# Patient Record
Sex: MALE | Race: WHITE | NOT HISPANIC OR LATINO | Employment: OTHER | URBAN - METROPOLITAN AREA
[De-identification: names, ages, dates, MRNs, and addresses within clinical notes are randomized per-mention and may not be internally consistent; named-entity substitution may affect disease eponyms.]

---

## 2017-01-09 RX ORDER — LISINOPRIL 5 MG/1
TABLET ORAL
Qty: 90 TABLET | Refills: 3 | Status: SHIPPED | OUTPATIENT
Start: 2017-01-09 | End: 2018-01-16 | Stop reason: SDUPTHER

## 2017-01-25 DIAGNOSIS — I47.29 NON-SUSTAINED VENTRICULAR TACHYCARDIA (HCC): Primary | ICD-10-CM

## 2017-02-16 ENCOUNTER — TELEPHONE (OUTPATIENT)
Dept: CARDIOLOGY | Facility: CLINIC | Age: 57
End: 2017-02-16

## 2017-02-16 RX ORDER — METOPROLOL SUCCINATE 50 MG/1
50 TABLET, EXTENDED RELEASE ORAL DAILY
Qty: 90 TABLET | Refills: 3 | Status: SHIPPED | OUTPATIENT
Start: 2017-02-16 | End: 2017-05-18

## 2017-02-16 NOTE — TELEPHONE ENCOUNTER
In regards to remote device check dated 1/21/17-   PT got labs done- Dr. Campbell reviewed-  No changes based off of lab work-    However, she would like to increase Toprol XL to 50 mg daily- I called the PT and discussed thi with him- he verbalized understanding-  New script sent to pharmacy-

## 2017-05-18 ENCOUNTER — OFFICE VISIT (OUTPATIENT)
Dept: CARDIOLOGY | Facility: CLINIC | Age: 57
End: 2017-05-18

## 2017-05-18 VITALS
SYSTOLIC BLOOD PRESSURE: 120 MMHG | HEIGHT: 69 IN | WEIGHT: 257.1 LBS | BODY MASS INDEX: 38.08 KG/M2 | DIASTOLIC BLOOD PRESSURE: 70 MMHG | HEART RATE: 108 BPM

## 2017-05-18 DIAGNOSIS — I10 ESSENTIAL HYPERTENSION: Chronic | ICD-10-CM

## 2017-05-18 DIAGNOSIS — E78.2 MIXED HYPERLIPIDEMIA: Chronic | ICD-10-CM

## 2017-05-18 DIAGNOSIS — I42.8 NONISCHEMIC CARDIOMYOPATHY (HCC): Primary | Chronic | ICD-10-CM

## 2017-05-18 PROCEDURE — 93289 INTERROG DEVICE EVAL HEART: CPT | Performed by: INTERNAL MEDICINE

## 2017-05-18 PROCEDURE — 99214 OFFICE O/P EST MOD 30 MIN: CPT | Performed by: NURSE PRACTITIONER

## 2017-05-18 RX ORDER — ACETAMINOPHEN 650 MG/1
TABLET, FILM COATED, EXTENDED RELEASE ORAL AS NEEDED
Refills: 2 | COMMUNITY
Start: 2017-04-28 | End: 2017-12-07

## 2017-05-18 RX ORDER — METOPROLOL SUCCINATE 25 MG/1
50 TABLET, EXTENDED RELEASE ORAL 2 TIMES DAILY
Refills: 2 | COMMUNITY
Start: 2017-04-28

## 2017-08-30 ENCOUNTER — CLINICAL SUPPORT NO REQUIREMENTS (OUTPATIENT)
Dept: CARDIOLOGY | Facility: CLINIC | Age: 57
End: 2017-08-30

## 2017-08-30 DIAGNOSIS — I42.8 NONISCHEMIC CARDIOMYOPATHY (HCC): Chronic | ICD-10-CM

## 2017-08-30 PROCEDURE — 93295 DEV INTERROG REMOTE 1/2/MLT: CPT | Performed by: INTERNAL MEDICINE

## 2017-08-30 PROCEDURE — 93296 REM INTERROG EVL PM/IDS: CPT | Performed by: INTERNAL MEDICINE

## 2017-12-07 ENCOUNTER — OFFICE VISIT (OUTPATIENT)
Dept: CARDIOLOGY | Facility: CLINIC | Age: 57
End: 2017-12-07

## 2017-12-07 VITALS
SYSTOLIC BLOOD PRESSURE: 104 MMHG | HEIGHT: 69 IN | DIASTOLIC BLOOD PRESSURE: 62 MMHG | WEIGHT: 256.6 LBS | HEART RATE: 74 BPM | BODY MASS INDEX: 38 KG/M2 | OXYGEN SATURATION: 96 %

## 2017-12-07 DIAGNOSIS — I10 ESSENTIAL HYPERTENSION: Chronic | ICD-10-CM

## 2017-12-07 DIAGNOSIS — E78.2 MIXED HYPERLIPIDEMIA: Chronic | ICD-10-CM

## 2017-12-07 DIAGNOSIS — I42.8 NONISCHEMIC CARDIOMYOPATHY (HCC): Primary | Chronic | ICD-10-CM

## 2017-12-07 PROCEDURE — 93283 PRGRMG EVAL IMPLANTABLE DFB: CPT | Performed by: INTERNAL MEDICINE

## 2017-12-07 PROCEDURE — 99213 OFFICE O/P EST LOW 20 MIN: CPT | Performed by: INTERNAL MEDICINE

## 2017-12-07 NOTE — PROGRESS NOTES
Ren Mario  1960  [phone]      12/07/2017    Heidi Ellington, APRN    Chief Complaint   Patient presents with   • Cardiomyopathy     Problem List  1. Nonischemic cardiomyopathy:  a. Abnormal echocardiogram, July 2014, revealing LVEF (25% to 30%) with mild-to-moderate mitral regurgitation, trace tricuspid regurgitation, and mild diastolic dysfunction.  b. Abnormal Cardiolite Lexiscan study, 08/07/2014, Candida Campbell MD, revealing severe left ventricular systolic dysfunction with an ejection fraction of 25%, with evidence of potential ischemia in the anterolateral and inferior myocardial segments.  c. Cardiac catheterization, 09/02/2014, Candida Campbell MD, revealing noncritical coronary artery disease and LVEF (25% to 30%).  d. Medical management with initiation of ACE inhibitor therapy (patient was already on beta blockade).  e. LVEF (20%), 12/08/2014 with trace MR and trace TR.   f. Dual-chamber ICD placed 04/21/2015, by Dr. Leroy Priest using a [facility] [employer] Ellip[employer] DRJaylin quintero. Echocardiogram, 11/7/2016: EF= 45-50%. Trace MR and trace TR.   2. Hypertension.   3. Hyperlipidemia.  4. Obesity with BMI 40.4.  5. Type 2 diabetes mellitus.  6. Chronic low back pain.  7. Surgical history:  a. Left arm fracture at age [age].      Allergies   Allergen Reactions   • Lipitor [Atorvastatin]      muscle ache and fatigue.   • Naprosyn [Naproxen]        Current Medications:      Current Outpatient Prescriptions:   •  allopurinol (ZYLOPRIM) 300 MG tablet, Daily., Disp: , Rfl:   •  amLODIPine (NORVASC) 5 MG tablet, Daily., Disp: , Rfl:   •  ASPIRIN LOW DOSE 81 MG EC tablet, Daily., Disp: , Rfl: 1  •  colchicine 0.6 MG tablet, As Needed., Disp: , Rfl: 2  •  cyclobenzaprine (FLEXERIL) 10 MG tablet, As Needed., Disp: , Rfl:   •  furosemide (LASIX) 40 MG tablet, Daily., Disp: , Rfl:   •  lisinopril (PRINIVIL,ZESTRIL) 5 MG tablet, TAKE ONE TABLET BY MOUTH DAILY, Disp: 90 tablet, Rfl: 3  •  metFORMIN  "(GLUCOPHAGE) 1000 MG tablet, 2 (Two) Times a Day., Disp: , Rfl: 2  •  metoprolol succinate XL (TOPROL-XL) 25 MG 24 hr tablet, 2 (Two) Times a Day., Disp: , Rfl: 2  •  potassium chloride (K-DUR,KLOR-CON) 20 MEQ CR tablet, Daily., Disp: , Rfl:   •  rosuvastatin (CRESTOR) 20 MG tablet, Daily., Disp: , Rfl: 1    HPI    Ren Mario presents today for 6 month follow up of nonischemic cardiomyopathy, hypertension, and hyperlipidemia. Since last visit, patient has been feeling well overall from a cardiovascular standpoint. He monitors his blood pressure at home and notes it typically runs within normal limits. He notes some shortness of breath when he bends over. Patient denies chest pain, palpitations, shortness of breath, edema, PND, orthopnea, dizziness, and syncope. He has been cutting fire wood for exercise. He believes that DIEUDONNE Bryant, is following his labs.His ICD site itches  Occasionally.    The following portions of the patient's history were reviewed and updated as appropriate: allergies, current medications and problem list.    Pertinent positives as listed in the HPI.  All other systems reviewed are negative.    Vitals:    12/07/17 1301   BP: 104/62   BP Location: Right arm   Patient Position: Sitting   Pulse: 74   SpO2: 96%   Weight: 116 kg (256 lb 9.6 oz)   Height: 175.3 cm (69\")       Physical Exam:  General: Alert and oriented to person, place, and time.  Neck: Jugular venous pressure is within normal limits. Carotids have normal upstrokes without bruits.   Cardiovascular: Regular rate and rhythm without murmur gallop or rub.  Lungs: Clear without rales or wheezes. Equal expansion is noted.   Extremities: Show no edema.  Skin: warm and dry.  Neurologic: nonfocal    Diagnostic Data:    Procedures     DEVICE INTERROGATION:  12/7/2017, [facility] ICD: RA pacing 2.8%, RV pacing <1%. P wave is >5 mV with a threshold of 0.5 V at 0.5 msec and an impedance of 450 ohms. R wave is 11.7 mV with a threshold of 0.5 " V at 0.5 msec and an impedance of 560 ohms. HV 95 ohms. Battery voltage is 73%, 5.4-6.2 years. NSVT 10/14/2017, SVT 12/6/2017. AMS <1%. DDDR 60/120.        Assessment:      ICD-10-CM ICD-9-CM   1. Nonischemic cardiomyopathy I42.8 425.4   2. Essential hypertension I10 401.9   3. Mixed hyperlipidemia E78.2 272.2       Plan:    1.   2. Continue current medications.  3. F/up in 6 months with device interrogation, or sooner if needed.    Scribed for Candida Campbell MD by Nima Luu. 12/7/2017  2:33 PM    I Candida Campbell MD personally performed the services described in this documentation as scribed by the above individual in my presence, and it is both accurate and complete.    Candida Campbell MD, FACC    I Candida Campbell MD personally performed the services described in this documentation as scribed by the above individual in my presence, and it is both accurate and complete.    Candida Campbell MD, FACC

## 2018-01-16 RX ORDER — LISINOPRIL 5 MG/1
TABLET ORAL
Qty: 90 TABLET | Refills: 3 | Status: SHIPPED | OUTPATIENT
Start: 2018-01-16 | End: 2019-02-04 | Stop reason: SDUPTHER

## 2018-04-19 ENCOUNTER — TELEPHONE (OUTPATIENT)
Dept: CARDIOLOGY | Facility: CLINIC | Age: 58
End: 2018-04-19

## 2018-04-19 NOTE — TELEPHONE ENCOUNTER
Called pt due to Merlin home monitor not transmitting.  Pt has cell adapter and phone line both plugged up.  Asked for pt to unplug phone line.  Attempted to send in a manual transmission and unsure if cellular adapter is getting enough signal.  Will check tomorrow to see if pt connected.

## 2018-06-21 ENCOUNTER — OFFICE VISIT (OUTPATIENT)
Dept: CARDIOLOGY | Facility: CLINIC | Age: 58
End: 2018-06-21

## 2018-06-21 VITALS
BODY MASS INDEX: 38.36 KG/M2 | SYSTOLIC BLOOD PRESSURE: 90 MMHG | OXYGEN SATURATION: 98 % | HEART RATE: 72 BPM | HEIGHT: 69 IN | DIASTOLIC BLOOD PRESSURE: 60 MMHG | WEIGHT: 259 LBS

## 2018-06-21 DIAGNOSIS — E78.2 MIXED HYPERLIPIDEMIA: Chronic | ICD-10-CM

## 2018-06-21 DIAGNOSIS — I10 ESSENTIAL HYPERTENSION: Chronic | ICD-10-CM

## 2018-06-21 DIAGNOSIS — I42.8 NONISCHEMIC CARDIOMYOPATHY (HCC): Primary | Chronic | ICD-10-CM

## 2018-06-21 PROCEDURE — 99213 OFFICE O/P EST LOW 20 MIN: CPT | Performed by: NURSE PRACTITIONER

## 2018-06-21 NOTE — PROGRESS NOTES
Ren Mario  1960  [phone]      06/21/2018    Heidi Ellington, APRN    Chief Complaint   Patient presents with   • Cardiomyopathy     Denies having any complaints   • Pacemaker Check   • Hypertension       Problem List  1. Nonischemic cardiomyopathy:  a. Abnormal echocardiogram, July 2014, revealing LVEF (25% to 30%) with mild-to-moderate mitral regurgitation, trace tricuspid regurgitation, and mild diastolic dysfunction.  b. Abnormal Cardiolite Lexiscan study, 08/07/2014, Candida Campbell MD, revealing severe left ventricular systolic dysfunction with an ejection fraction of 25%, with evidence of potential ischemia in the anterolateral and inferior myocardial segments.  c. Cardiac catheterization, 09/02/2014, Candida Campbell MD, revealing noncritical coronary artery disease and LVEF (25% to 30%).  d. Medical management with initiation of ACE inhibitor therapy (patient was already on beta blockade).  e. LVEF (20%), 12/08/2014 with trace MR and trace TR.   f. Dual-chamber ICD placed 04/21/2015, by Dr. Leroy Priest using a [facility] [employer] Ellipse DR. quintero. Echocardiogram, 11/7/2016: EF= 45-50%. Trace MR and trace TR.   2. Hypertension.   3. Hyperlipidemia.  4. Obesity with BMI 40.4.  5. Type 2 diabetes mellitus.  6. Chronic low back pain.  7. Surgical history:  a. Left arm fracture at age [age].    Allergies   Allergen Reactions   • Lipitor [Atorvastatin]      muscle ache and fatigue.   • Naprosyn [Naproxen]        Current Medications    Current Outpatient Prescriptions:   •  allopurinol (ZYLOPRIM) 300 MG tablet, Daily., Disp: , Rfl:   •  amLODIPine (NORVASC) 5 MG tablet, Daily., Disp: , Rfl:   •  ASPIRIN LOW DOSE 81 MG EC tablet, Daily., Disp: , Rfl: 1  •  colchicine 0.6 MG tablet, As Needed., Disp: , Rfl: 2  •  cyclobenzaprine (FLEXERIL) 10 MG tablet, As Needed., Disp: , Rfl:   •  furosemide (LASIX) 40 MG tablet, Daily., Disp: , Rfl:   •  lisinopril (PRINIVIL,ZESTRIL) 5 MG tablet, TAKE ONE  "TABLET BY MOUTH DAILY, Disp: 90 tablet, Rfl: 3  •  metFORMIN (GLUCOPHAGE) 1000 MG tablet, 2 (Two) Times a Day., Disp: , Rfl: 2  •  metoprolol succinate XL (TOPROL-XL) 25 MG 24 hr tablet, 2 (Two) Times a Day., Disp: , Rfl: 2  •  potassium chloride (K-DUR,KLOR-CON) 20 MEQ CR tablet, Daily., Disp: , Rfl:   •  rosuvastatin (CRESTOR) 20 MG tablet, Daily., Disp: , Rfl: 1    History of Present Illness   HPI  Patient is a pleasant 57-year-old  male with the above-noted medical history presents today for 6 month follow-up of his nonischemic cardiomyopathy, hypertension and hyperlipidemia.  Since his last visit he has been due to feeling well from a cardiac standpoint.  His most recent lipid status was drawn on March 25, 2018 indicating an LDL of 9 and an HDL of 33.  He denies having any episodes of chest pain, shortness of breath, dyspnea on exertion, edema, fatigue, palpitations, dizziness and syncope.  He does admit that he continues to eat too much sodium in his diet and his device is showing intermittent fluctuations on [employer] regarding this.  Although he denies being symptomatic, we discussed the importance of keeping his sodium intake at a minimum regarding his heart failure.  Overall he is doing a cardiac standpoint with no complaints.    The following portions of the patient's history were reviewed and updated as appropriate: allergies, current medications and problem list.    Pertinent positives as listed in the HPI.  All other systems reviewed are negative.    Vitals:    06/21/18 1359   BP: 90/60   BP Location: Right arm   Patient Position: Sitting   Pulse: 72   SpO2: 98%   Weight: 117 kg (259 lb)   Height: 175.3 cm (69\")       Physical Exam  GENERAL: well-developed, well-nourished; in no acute distress.   NECK:  There is no jugular venous distention at 30°.  Carotid upstrokes are 2+ and  symmetrical without bruits.   LUNGS: Clear to auscultation bilaterally without wheezing, rhonchi, or rales noted. "   CARDIOVASCULAR: The heart has a regular rate with a normal S1 and S2. There is no murmur, gallop, rub, or click appreciated. The PMI is nondisplaced.   ABDOMEN: Soft and nontender  NEUROLOGICAL: Nonfocal; Alert and oriented  PERIPHERAL VASCULAR:  Posterior tibial and dorsalis pedis pulses are 2+ and symmetrical. There is no peripheral edema.   MUSCULOSKELETAL:  Normal ROM  SKIN:  Warm and dry  PSYCHIATRIC: normal mood and affect; behavior appropriate    Diagnostic Data:  Procedures   DEVICE INTERROGATION:  6/21/2018, [id number] .: RA pacing 7.5%, RV pacing <1%. P wave is 4.7 mV with a threshold of 0.62 V at 0.5 msec and an impedance of 410 ohms. R wave is 11.7 mV with a threshold of 0.5 V at 0.5 msec and an impedance of 460 ohms. Battery voltage is 68 % @ 5.8 years.  Mode switching stent ×8 with 3 episodes of atrial tach.  Longest was 2 minutes and 54 seconds in the right the 140s on January 14.  There was a single recent episode on June 5 of short duration.  All other mode switches were competitive a pacing without evidence of atrial fibrillation..    Assessment:      ICD-10-CM ICD-9-CM   1. Nonischemic cardiomyopathy I42.8 425.4   2. Essential hypertension I10 401.9   3. Mixed hyperlipidemia E78.2 272.2       Plan:  Decrease sodium intake  Encouraged routine exercise  Continue current medications as directed  F/up in 6 months with [facility] or sooner if needed.    Seen independently by DIEUDONNE Esparza on June 21, 2018 [id number]

## 2018-09-27 ENCOUNTER — TELEPHONE (OUTPATIENT)
Dept: CARDIOLOGY | Facility: CLINIC | Age: 58
End: 2018-09-27

## 2018-09-28 ENCOUNTER — TELEPHONE (OUTPATIENT)
Dept: CARDIOLOGY | Facility: CLINIC | Age: 58
End: 2018-09-28

## 2018-09-28 NOTE — TELEPHONE ENCOUNTER
Mr Mario returned my call regarding his home monitor. We made several attempts to manually transmit and it would not connect. I gave him tech support at [facility] and ask him to give them a call for assistance.

## 2019-01-03 ENCOUNTER — OFFICE VISIT (OUTPATIENT)
Dept: CARDIOLOGY | Facility: CLINIC | Age: 59
End: 2019-01-03

## 2019-01-03 VITALS
BODY MASS INDEX: 37.33 KG/M2 | HEART RATE: 70 BPM | WEIGHT: 252 LBS | HEIGHT: 69 IN | DIASTOLIC BLOOD PRESSURE: 58 MMHG | SYSTOLIC BLOOD PRESSURE: 104 MMHG

## 2019-01-03 DIAGNOSIS — I10 ESSENTIAL HYPERTENSION: Chronic | ICD-10-CM

## 2019-01-03 DIAGNOSIS — I42.8 NONISCHEMIC CARDIOMYOPATHY (HCC): Primary | Chronic | ICD-10-CM

## 2019-01-03 DIAGNOSIS — E78.2 MIXED HYPERLIPIDEMIA: Chronic | ICD-10-CM

## 2019-01-03 PROCEDURE — 93283 PRGRMG EVAL IMPLANTABLE DFB: CPT | Performed by: INTERNAL MEDICINE

## 2019-01-03 PROCEDURE — 99214 OFFICE O/P EST MOD 30 MIN: CPT | Performed by: INTERNAL MEDICINE

## 2019-01-03 RX ORDER — LINAGLIPTIN 5 MG/1
5 TABLET, FILM COATED ORAL DAILY
Refills: 2 | COMMUNITY
Start: 2018-12-03 | End: 2020-05-21

## 2019-01-03 NOTE — PROGRESS NOTES
Ren Mario  1960  58 y.o.  [phone] 01/03/2019    Heidi Ellington APRN    Chief Complaint   Patient presents with   • Nonischemic cardiomyopathy (CMS/HCC)       Problem List:  1. Nonischemic cardiomyopathy:  a. Abnormal echocardiogram, July 2014: EF 25-30%. Mild-to-moderate MR, trace TR, and mild diastolic dysfunction.  b. Abnormal Cardiolite GXT, 08/07/2014, Candida Campbell MD: Severe left ventricular systolic dysfunction with an EF 25%, with evidence of potential ischemia in the anterolateral and inferior myocardial segments.  c. LHC, 09/02/2014, Candida Campbell MD: Non-critical CAD and EF 25-30%.  d. Medical management with initiation of ACE inhibitor therapy (patient was already on beta blockade).  e. Echocardiogram, 12/08/2014: EF 20% with trace MR and trace TR.   f. Dual-chamber ICD placed 04/21/2015, by Dr. Leroy Priest using a [employer] [facility] [employer] Ell[employer] DRJaylin quintero. Echocardiogram, 11/07/2016: EF 45-50%. Trace MR and trace TR.   2. Hypertension.   3. Hyperlipidemia.  4. Obesity with BMI 40.4.  5. Type 2 diabetes mellitus.  6. Hypothyroidism dx 2018  7. Chronic low back pain.  8. Surgical history:  a. Left arm fracture at age [age].    Allergies   Allergen Reactions   • Lipitor [Atorvastatin]      muscle ache and fatigue.   • Naprosyn [Naproxen]        Current Medications:      Current Outpatient Medications:   •  allopurinol (ZYLOPRIM) 300 MG tablet, Daily., Disp: , Rfl:   •  amLODIPine (NORVASC) 5 MG tablet, Daily., Disp: , Rfl:   •  ASPIRIN LOW DOSE 81 MG EC tablet, Daily., Disp: , Rfl: 1  •  colchicine 0.6 MG tablet, As Needed., Disp: , Rfl: 2  •  cyclobenzaprine (FLEXERIL) 10 MG tablet, As Needed., Disp: , Rfl:   •  furosemide (LASIX) 40 MG tablet, Daily., Disp: , Rfl:   •  lisinopril (PRINIVIL,ZESTRIL) 5 MG tablet, TAKE ONE TABLET BY MOUTH DAILY, Disp: 90 tablet, Rfl: 3  •  metFORMIN (GLUCOPHAGE) 1000 MG tablet, 2 (Two) Times a Day., Disp: , Rfl: 2  •  metoprolol succinate XL  "(TOPROL-XL) 25 MG 24 hr tablet, 2 (Two) Times a Day., Disp: , Rfl: 2  •  potassium chloride (K-DUR,KLOR-CON) 20 MEQ CR tablet, Daily., Disp: , Rfl:   •  rosuvastatin (CRESTOR) 20 MG tablet, Daily., Disp: , Rfl: 1  •  TRADJENTA 5 MG tablet tablet, Take 5 mg by mouth Daily., Disp: , Rfl: 2    HPI    Ren Mario is a 58 y.o. male who presents today for 6 month follow up of nonischemic cardiomyopathy, hypertension, and hyperlipidemia. Since last visit, he has been doing well overall from a cardiovascular standpoint. He admits he does not have a regular exercise regimen, but is able to split and carry wood for up to 30 minutes, without significant limitations. He has been monitoring his BP at home, with readings within normal limits. Patient denies chest pain, palpitations, shortness of breath, edema, orthopnea, dizziness, and syncope. Patient reports he was recently put on Levothyroxine, but is unsure of the exact dosage.    The following portions of the patient's history were reviewed and updated as appropriate: allergies, current medications and problem list.    Pertinent positives as listed in the HPI.  All other systems reviewed are negative.    Vitals:    01/03/19 1508   BP: 104/58   BP Location: Right arm   Patient Position: Sitting   Pulse: 70   Weight: 114 kg (252 lb)   Height: 175.3 cm (69\")       Physical Exam:    General: Alert and oriented  Neck: Jugular venous pressure is within normal limits. Carotids have normal upstrokes without bruits.   Cardiovascular: Heart has a nondisplaced focal PMI. Regular rate and rhythm without murmur, gallop or rub.  Lungs: Clear without rales or wheezes. Equal expansion is noted.   Extremities: Show no edema.  Skin: warm and dry.  Neurologic: nonfocal    Diagnostic Data:    Lipid panel, 03/20/2018:  Chol 95  Trig 267 (H)  HDL 33  LDL 9    06/22/2018:  HDL 36  LDL 6      Procedures    DEVICE INTERROGATION: 1/3/2019, [facility] ICD:   RA pacing 10%, RV pacing < 1%%. P wave is " >5 mV with a threshold of 0.62 V at 0.5 msec and an impedance of 460 ohms. R wave is 11.7 mV with a threshold of 0.5 V at 0.5 msec and an impedance of 530 ohms.  Battery voltage is 63% (5.4 years).  Events: < 1% AMS, longest is 2 minutes 43 seconds.     Assessment:      ICD-10-CM ICD-9-CM   1. Nonischemic cardiomyopathy (CMS/HCC) I42.8 425.4   2. Essential hypertension I10 401.9   3. Mixed hyperlipidemia E78.2 272.2       Plan:    1. Echocardiogram, limited for EF, given cardiomyopathy with last echo in 2016.  2. Continue aspirin 81 mg for daily anticoagulation.  3. Continue amlodipine, lisinopril, metoprolol, and furosemide for hypertension.  4. Continue rosuvastatin 20 mg for hyperlipidemia.  5. Continue all other current medications.  6. F/up in 6 months with [facility] device interrogation or sooner if needed.    Scribed for Candida Campbell MD by Emmy Alberts. 1/3/2019  3:26 PM     I Candida Campbell MD personally performed the services described in this documentation as scribed by the above individual in my presence, and it is both accurate and complete.    Candida Campbell MD, [facility]

## 2019-01-08 ENCOUNTER — OUTSIDE FACILITY SERVICE (OUTPATIENT)
Dept: CARDIOLOGY | Facility: CLINIC | Age: 59
End: 2019-01-08

## 2019-01-08 ENCOUNTER — HOSPITAL ENCOUNTER (OUTPATIENT)
Dept: NON INVASIVE DIAGNOSTICS | Facility: HOSPITAL | Age: 59
Discharge: HOME | End: 2019-01-08
Payer: COMMERCIAL

## 2019-01-08 PROCEDURE — 93308 TTE F-UP OR LMTD: CPT

## 2019-01-08 PROCEDURE — 93306 TTE W/DOPPLER COMPLETE: CPT

## 2019-01-08 PROCEDURE — 93306 TTE W/DOPPLER COMPLETE: CPT | Performed by: INTERNAL MEDICINE

## 2019-02-04 RX ORDER — LISINOPRIL 5 MG/1
TABLET ORAL
Qty: 90 TABLET | Refills: 3 | Status: SHIPPED | OUTPATIENT
Start: 2019-02-04 | End: 2020-03-02

## 2019-08-01 ENCOUNTER — OFFICE VISIT (OUTPATIENT)
Dept: CARDIOLOGY | Facility: CLINIC | Age: 59
End: 2019-08-01

## 2019-08-01 VITALS
BODY MASS INDEX: 38.31 KG/M2 | SYSTOLIC BLOOD PRESSURE: 102 MMHG | WEIGHT: 252.8 LBS | RESPIRATION RATE: 18 BRPM | HEIGHT: 68 IN | DIASTOLIC BLOOD PRESSURE: 60 MMHG | HEART RATE: 79 BPM | OXYGEN SATURATION: 97 %

## 2019-08-01 DIAGNOSIS — E78.2 MIXED HYPERLIPIDEMIA: Chronic | ICD-10-CM

## 2019-08-01 DIAGNOSIS — I42.8 NONISCHEMIC CARDIOMYOPATHY (HCC): Primary | Chronic | ICD-10-CM

## 2019-08-01 DIAGNOSIS — I10 ESSENTIAL HYPERTENSION: Chronic | ICD-10-CM

## 2019-08-01 PROCEDURE — 93283 PRGRMG EVAL IMPLANTABLE DFB: CPT | Performed by: NURSE PRACTITIONER

## 2019-08-01 PROCEDURE — 99214 OFFICE O/P EST MOD 30 MIN: CPT | Performed by: NURSE PRACTITIONER

## 2019-08-01 RX ORDER — MAGNESIUM GLUCONATE 27 MG(500)
27 TABLET ORAL DAILY
COMMUNITY

## 2019-08-01 NOTE — PROGRESS NOTES
Ren Mario  1960  [phone]  Work phone not available    08/01/2019    Chandana, DIEUDONNE Seaman    Chief Complaint   Patient presents with   • Follow-up   • Shortness of Breath       Problem List:  1. Nonischemic cardiomyopathy:  a. Abnormal echocardiogram, July 2014: EF 25-30%. Mild-to-moderate MR, trace TR, and mild diastolic dysfunction.  b. Abnormal Cardiolite GXT, 08/07/2014, Candida Campbell MD: Severe left ventricular systolic dysfunction with an EF 25%, with evidence of potential ischemia in the anterolateral and inferior myocardial segments.  c. LHC, 09/02/2014, Candida Campbell MD: Non-critical CAD and EF 25-30%.  d. Medical management with initiation of ACE inhibitor therapy (patient was already on beta blockade).  e. Echocardiogram, 12/08/2014: EF 20% with trace MR and trace TR.   f. Dual-chamber ICD placed 04/21/2015, by Dr. Leroy Priest using a [facility] [employer] Ellipse DR. quintero. Echocardiogram, 11/07/2016: EF 45-50%. Trace MR and trace TR.   2. Hypertension.   3. Hyperlipidemia.  4. Obesity with BMI 40.4.  5. Type 2 diabetes mellitus.  6. Hypothyroidism dx 2018  7. Chronic low back pain.  8. Surgical history:  a. Left arm fracture at age [age].    Allergies   Allergen Reactions   • Lipitor [Atorvastatin]      muscle ache and fatigue.   • Naprosyn [Naproxen]        Current Medications    Current Outpatient Medications:   •  allopurinol (ZYLOPRIM) 300 MG tablet, Daily., Disp: , Rfl:   •  amLODIPine (NORVASC) 5 MG tablet, Daily., Disp: , Rfl:   •  ASPIRIN LOW DOSE 81 MG EC tablet, Daily., Disp: , Rfl: 1  •  colchicine 0.6 MG tablet, As Needed., Disp: , Rfl: 2  •  cyclobenzaprine (FLEXERIL) 10 MG tablet, As Needed., Disp: , Rfl:   •  furosemide (LASIX) 40 MG tablet, Daily., Disp: , Rfl:   •  lisinopril (PRINIVIL,ZESTRIL) 5 MG tablet, TAKE ONE TABLET BY MOUTH DAILY, Disp: 90 tablet, Rfl: 3  •  magnesium gluconate (MAGONATE) 500 MG tablet, Take 27 mg by mouth Daily., Disp: , Rfl:   •   "metFORMIN (GLUCOPHAGE) 1000 MG tablet, 2 (Two) Times a Day., Disp: , Rfl: 2  •  metoprolol succinate XL (TOPROL-XL) 25 MG 24 hr tablet, 2 (Two) Times a Day., Disp: , Rfl: 2  •  potassium chloride (K-DUR,KLOR-CON) 20 MEQ CR tablet, Daily., Disp: , Rfl:   •  rosuvastatin (CRESTOR) 20 MG tablet, Daily., Disp: , Rfl: 1  •  TRADJENTA 5 MG tablet tablet, Take 5 mg by mouth Daily., Disp: , Rfl: 2    History of Present Illness   HPI  Patient is a 58 year old male with the above noted medical history who presents today for his 6 month follow up of NICM, hypertension, and hyperlipidemia.  Since he was seen last, he has been feeling well from the cardiac standpoint.  He denies having any chest pain, shortness of breath, dyspnea on exertion, edema, fatigue, palpitations, dizziness, and syncope.  He ambulates with a can due to chronic back pain.  He does state that he gets most of his exercise hunting for mushrooms.  His device interrogation today revealed 4 brief episodes of NSVT (longest 8 seconds).  He was unaware of these events.  His lipids are followed by his primary.  Blood pressure and heart rate are adequately controlled on current medical therapy.      The following portions of the patient's history were reviewed and updated as appropriate: allergies, current medications and problem list.    Pertinent positives as listed in the HPI.  All other systems reviewed are negative.    Vitals:    08/01/19 1328   BP: 102/60   BP Location: Left arm   Patient Position: Sitting   Pulse: 79   Resp: 18   SpO2: 97%   Weight: 115 kg (252 lb 12.8 oz)   Height: 172.7 cm (68\")       Physical Exam  GENERAL: well-developed, well-nourished; in no acute distress.   NECK:  Carotid upstrokes are 2+ and  symmetrical without bruits.   LUNGS: Clear to auscultation bilaterally without wheezing, rhonchi, or rales noted.   CARDIOVASCULAR: The heart has a regular rate with a normal S1 and S2. There is no murmur, gallop, rub, or click appreciated. The " PMI is nondisplaced.   NEUROLOGICAL: Nonfocal; Alert and oriented  PERIPHERAL VASCULAR:  Posterior tibial pulses are 2+ and symmetrical. There is no peripheral edema.   SKIN:  Warm and dry  PSYCHIATRIC: normal mood and affect; behavior appropriate    Diagnostic Data:  Procedures  MANUAL DEVICE INTERROGATION:  8/1/2019, [facility] ICD Ellipse DR [id number]: RA pacing 14%, RV pacing <1%. P wave is >5.0 mV with a threshold of 0.75 V at 0.5 msec and an impedance of 450 ohms. R wave is 11.7 mV with a threshold of 0.5 V at 0.5 msec and an impedance of 560 ohms. Battery voltage is 4.3-4.9 years longevity.  4 episodes of NSVT with longest being 8 seconds.  He does not transmit from home.      Assessment:      ICD-10-CM ICD-9-CM   1. Nonischemic cardiomyopathy (CMS/HCC) I42.8 425.4   2. Essential hypertension I10 401.9   3. Mixed hyperlipidemia E78.2 272.2       Plan:  Encouraged routine exercise and dietary modifications  Continue aspirin for NICM   Continue amlodipine, lisinopril for hypertension  Continue toprol for rate control  Continue crestor for hyperlipidemia  F/up in 12 months or sooner if needed.      Seen independently by DIEUDONNE Esparza on August 1, 2019 @ [id number]

## 2019-09-03 ENCOUNTER — HOSPITAL ENCOUNTER (OUTPATIENT)
Facility: HOSPITAL | Age: 59
Discharge: HOME | End: 2019-09-03
Payer: COMMERCIAL

## 2019-09-03 ENCOUNTER — HOSPITAL ENCOUNTER (OUTPATIENT)
Dept: GENERAL RADIOLOGY | Facility: HOSPITAL | Age: 59
Discharge: HOME | End: 2019-09-03
Payer: COMMERCIAL

## 2019-09-03 DIAGNOSIS — M54.5 ACUTE BILATERAL LOW BACK PAIN, WITH SCIATICA PRESENCE UNSPECIFIED: ICD-10-CM

## 2019-09-03 PROCEDURE — 72100 X-RAY EXAM L-S SPINE 2/3 VWS: CPT

## 2019-12-10 ENCOUNTER — HOSPITAL ENCOUNTER (OUTPATIENT)
Dept: MRI IMAGING | Facility: HOSPITAL | Age: 59
Discharge: HOME | End: 2019-12-10
Payer: COMMERCIAL

## 2019-12-10 DIAGNOSIS — M54.42 ACUTE BACK PAIN WITH SCIATICA, LEFT: ICD-10-CM

## 2020-03-02 RX ORDER — LISINOPRIL 5 MG/1
TABLET ORAL
Qty: 90 TABLET | Refills: 0 | Status: SHIPPED | OUTPATIENT
Start: 2020-03-02 | End: 2020-04-06

## 2020-04-06 RX ORDER — LISINOPRIL 5 MG/1
TABLET ORAL
Qty: 90 TABLET | Refills: 1 | Status: SHIPPED | OUTPATIENT
Start: 2020-04-06 | End: 2021-02-15

## 2020-05-21 ENCOUNTER — OFFICE VISIT (OUTPATIENT)
Dept: CARDIOLOGY | Facility: CLINIC | Age: 60
End: 2020-05-21

## 2020-05-21 VITALS
OXYGEN SATURATION: 97 % | DIASTOLIC BLOOD PRESSURE: 78 MMHG | HEIGHT: 69 IN | BODY MASS INDEX: 39.1 KG/M2 | HEART RATE: 78 BPM | WEIGHT: 264 LBS | SYSTOLIC BLOOD PRESSURE: 144 MMHG

## 2020-05-21 DIAGNOSIS — I10 ESSENTIAL HYPERTENSION: Chronic | ICD-10-CM

## 2020-05-21 DIAGNOSIS — E78.2 MIXED HYPERLIPIDEMIA: Chronic | ICD-10-CM

## 2020-05-21 DIAGNOSIS — I42.8 NONISCHEMIC CARDIOMYOPATHY (HCC): Primary | Chronic | ICD-10-CM

## 2020-05-21 PROCEDURE — 99214 OFFICE O/P EST MOD 30 MIN: CPT | Performed by: INTERNAL MEDICINE

## 2020-05-21 RX ORDER — SITAGLIPTIN 100 MG/1
100 TABLET, FILM COATED ORAL DAILY
COMMUNITY
Start: 2020-04-27

## 2020-05-21 NOTE — PROGRESS NOTES
[facility]    Patient ID: Ren Mario is a 59 y.o. male.  : 1960   Contact: [phone]    Encounter date: 2020    PCP: Heidi Ellington APRN      Chief complaint:   Chief Complaint   Patient presents with   • Nonischemic cardiomyopathy (CMS/[facility]       Problem List:  1. Nonischemic cardiomyopathy:  a. Abnormal echocardiogram, 2014: EF 25-30%. Mild-to-moderate MR, trace TR, and mild diastolic dysfunction.  b. Abnormal Cardiolite GXT, 2014, [facility]: EF 25%. Evidence of potential ischemia in the anterolateral and inferior myocardial segments.  c. LHC, 2014, [facility]: Non-critical CAD. EF 25-30%.  d. Medical management with initiation of ACE inhibitor therapy (patient was already on beta blockade).  e. Echocardiogram, 2014: EF 20%. Ttrace MR and trace TR.   f. Dual-chamber ICD placed 2015, by Dr. Leroy Priest using a [employer] Ellipse DR. quintero. Echocardiogram, 2016: EF 45-50%. Trace MR and trace TR.   h. Echocardiogram, 2019: EF 55%. Borderline LV enlargement.  2. Hypertension.   3. Hyperlipidemia.  4. Obesity with BMI 39.  5. Type 2 DM.  6. Hypothyroidism dx 2018  7. Chronic low back pain.  8. Former tobacco abuse, cessation 15+ years ago.  9. Surgical history:  a. Left arm fracture at age [age].    Allergies   Allergen Reactions   • Lipitor [Atorvastatin]      muscle ache and fatigue.   • Naprosyn [Naproxen]        Current Medications:    Current Outpatient Medications:   •  allopurinol (ZYLOPRIM) 300 MG tablet, Take 300 mg by mouth Daily., Disp: , Rfl:   •  amLODIPine (NORVASC) 5 MG tablet, Take 5 mg by mouth Daily., Disp: , Rfl:   •  ASPIRIN LOW DOSE 81 MG EC tablet, Take 81 mg by mouth Daily., Disp: , Rfl: 1  •  colchicine 0.6 MG tablet, Take 0.6 mg by mouth As Needed., Disp: , Rfl: 2  •  cyclobenzaprine (FLEXERIL) 10 MG tablet, Take 10 mg by mouth As Needed., Disp: , Rfl:   •  furosemide (LASIX) 40 MG tablet, Take 40  "mg by mouth Daily., Disp: , Rfl:   •  lisinopril (PRINIVIL,ZESTRIL) 5 MG tablet, TAKE ONE TABLET BY MOUTH DAILY, Disp: 90 tablet, Rfl: 1  •  magnesium gluconate (MAGONATE) 500 MG tablet, Take 27 mg by mouth Daily., Disp: , Rfl:   •  metFORMIN (GLUCOPHAGE) 1000 MG tablet, Take 1,000 mg by mouth 2 (Two) Times a Day., Disp: , Rfl: 2  •  metoprolol succinate XL (TOPROL-XL) 25 MG 24 hr tablet, Take 25 mg by mouth 2 (Two) Times a Day., Disp: , Rfl: 2  •  potassium chloride (K-DUR,KLOR-CON) 20 MEQ CR tablet, Take 20 mEq by mouth Daily., Disp: , Rfl:   •  rosuvastatin (CRESTOR) 20 MG tablet, Take 20 mg by mouth Daily., Disp: , Rfl: 1  •  JANUVIA 100 MG tablet, Take 100 mg by mouth Daily., Disp: , Rfl:     HPI    Ren Mario is a 59 y.o. male who presents today for a follow up of nonischemic cardiomyopathy s/p ICD and cardiac risk factors. Since last visit, he has been feeling well overall from a cardiovascular standpoint. He does report some dyspnea on exertion. He does try to walk every day, when the weather permits. He does not have a formal exercise routine. He is unsure what his BP typically runs, but states he was told it had been \"very good\". Patient denies chest pain, palpitations, edema, dizziness, and syncope.        The following portions of the patient's history were reviewed and updated as appropriate: allergies, current medications and problem list.    Pertinent positives as listed in the HPI.  All other systems reviewed are negative.         Vitals:    05/21/20 1041   BP: 144/78   Pulse: 78   SpO2: 97%   Weight: 120 kg (264 lb)   Height: 175.3 cm (69\")       Physical Exam:  General: Alert and oriented.  Neck: Jugular venous pressure is within normal limits. Carotids have normal upstrokes without bruits.   Cardiovascular: Heart has a nondisplaced focal PMI. Regular rate and rhythm. No murmur, gallop or rub.  Lungs: Clear, no rales or wheezes. Equal expansion is noted.   Extremities: Show no edema.  Skin: " Warm and dry.  Neurologic: Nonfocal.     Diagnostic Data (reviewed with patient):    11/13/2019:  · FLP: TC 84,  (H), HDL 31 (L), LDL 6  · Hgb A1c: 6.2  · TSH: 2.32  · CMP: Glu 176 (H), otherwise normal CMP  · CBC: normal    Procedures      Assessment:    ICD-10-CM ICD-9-CM   1. Nonischemic cardiomyopathy (CMS/HCC) I42.8 425.4   2. Essential hypertension I10 401.9   3. Mixed hyperlipidemia E78.2 272.2     Patient congratulated on his lipid levels for November 2019.   Echo from Jan 2019 reviewed with patient. Normalization of EF.      Plan:  1. Begin routine aerobic exercise, at least 30 minutes 4 days per week.  2. Will need to return for ICD check in near future as he does not transmit from home.  3. Continue amlodipine, lisinopril, metoprolol, and Lasix for hypertension and NICM.  4. Continue rosuvastatin 20 mg for hyperlipidemia.  5. Continue all other current medications.  6. F/up in 6 months with device check, sooner if needed.      Scribed for Candida Campbell MD by Emmy Albrets. 5/21/2020  10:52     I Candida Campbell MD personally performed the services described in this documentation as scribed by the above individual in my presence, and it is both accurate and complete.    Candida Campbell MD, FACC

## 2020-11-19 ENCOUNTER — OFFICE VISIT (OUTPATIENT)
Dept: CARDIOLOGY | Facility: CLINIC | Age: 60
End: 2020-11-19

## 2020-11-19 VITALS
DIASTOLIC BLOOD PRESSURE: 64 MMHG | HEART RATE: 71 BPM | SYSTOLIC BLOOD PRESSURE: 126 MMHG | HEIGHT: 69 IN | OXYGEN SATURATION: 97 % | WEIGHT: 256.4 LBS | BODY MASS INDEX: 37.98 KG/M2

## 2020-11-19 DIAGNOSIS — E78.2 MIXED HYPERLIPIDEMIA: Chronic | ICD-10-CM

## 2020-11-19 DIAGNOSIS — I42.8 NONISCHEMIC CARDIOMYOPATHY (HCC): Primary | Chronic | ICD-10-CM

## 2020-11-19 DIAGNOSIS — I10 ESSENTIAL HYPERTENSION: Chronic | ICD-10-CM

## 2020-11-19 PROCEDURE — 99213 OFFICE O/P EST LOW 20 MIN: CPT | Performed by: INTERNAL MEDICINE

## 2020-11-19 PROCEDURE — 93283 PRGRMG EVAL IMPLANTABLE DFB: CPT | Performed by: INTERNAL MEDICINE

## 2020-11-19 NOTE — PROGRESS NOTES
[facility]    Patient ID: Ren Mario is a 60 y.o. male.  : 1960   Contact: [phone]    Encounter date: 2020    PCP: Heidi Ellington APRN      Chief complaint:   Chief Complaint   Patient presents with   • Nonischemic cardiomyopathy (CMS/HCC)       Problem List:  1. Nonischemic cardiomyopathy:  a. Abnormal echocardiogram, 2014: EF 25-30%. Mild-to-moderate MR, trace TR, and mild diastolic dysfunction.  b. Abnormal Cardiolite GXT, 2014, [facility]: EF 25%. Evidence of potential ischemia in the anterolateral and inferior myocardial segments.  c. LHC, 2014, [facility]: Non-critical CAD. EF 25-30%.  d. Medical management with initiation of ACE inhibitor therapy (patient was already on beta blockade).  e. Echocardiogram, 2014: EF 20%. Ttrace MR and trace TR.   f. Dual-chamber ICD placed 2015, by Dr. Leroy Priest using a [employer] Ellipse DR. quintero. Echocardiogram, 2016: EF 45-50%. Trace MR and trace TR.   h. Echocardiogram, 2019: EF 55%. Borderline LV enlargement.  2. Hypertension.   3. Hyperlipidemia.  4. Obesity with BMI 39.  5. Type 2 DM.  6. Hypothyroidism dx 2018  7. Chronic low back pain.  8. Former tobacco abuse, cessation 15+ years ago.  9. Surgical history:  a. Left arm fracture at age [age].    Allergies   Allergen Reactions   • Lipitor [Atorvastatin]      muscle ache and fatigue.   • Naprosyn [Naproxen]        Current Medications:    Current Outpatient Medications:   •  allopurinol (ZYLOPRIM) 300 MG tablet, Take 300 mg by mouth Daily., Disp: , Rfl:   •  amLODIPine (NORVASC) 5 MG tablet, Take 5 mg by mouth Daily., Disp: , Rfl:   •  ASPIRIN LOW DOSE 81 MG EC tablet, Take 81 mg by mouth Daily., Disp: , Rfl: 1  •  colchicine 0.6 MG tablet, Take 0.6 mg by mouth As Needed., Disp: , Rfl: 2  •  cyclobenzaprine (FLEXERIL) 10 MG tablet, Take 10 mg by mouth As Needed., Disp: , Rfl:   •  furosemide (LASIX) 40 MG tablet, Take 40  "mg by mouth Daily., Disp: , Rfl:   •  JANUVIA 100 MG tablet, Take 100 mg by mouth Daily., Disp: , Rfl:   •  lisinopril (PRINIVIL,ZESTRIL) 5 MG tablet, TAKE ONE TABLET BY MOUTH DAILY, Disp: 90 tablet, Rfl: 1  •  magnesium gluconate (MAGONATE) 500 MG tablet, Take 27 mg by mouth Daily., Disp: , Rfl:   •  metFORMIN (GLUCOPHAGE) 1000 MG tablet, Take 1,000 mg by mouth 2 (Two) Times a Day., Disp: , Rfl: 2  •  metoprolol succinate XL (TOPROL-XL) 25 MG 24 hr tablet, Take 25 mg by mouth 2 (Two) Times a Day., Disp: , Rfl: 2  •  potassium chloride (K-DUR,KLOR-CON) 20 MEQ CR tablet, Take 20 mEq by mouth Daily., Disp: , Rfl:   •  rosuvastatin (CRESTOR) 20 MG tablet, Take 20 mg by mouth Daily., Disp: , Rfl: 1    HPI    Ren Mario is a 60 y.o. male who presents today for a 6 month follow up of nonischemic cardiomyopathy and cardiac risk factors. Since last visit, he has been feeling well from a cardiovascular standpoint. He has been monitoring his blood pressure at home and notes that it is \"good\". He has been trying to walk daily and stays active around the house. He has not had any issues with his defibrillator and notes that it has not fired. Patient otherwise denies chest pain, shortness of breath, PND, edema, palpitations, syncope, or presyncope at this time.       The following portions of the patient's history were reviewed and updated as appropriate: allergies, current medications and problem list.    Pertinent positives as listed in the HPI.  All other systems reviewed are negative.         Vitals:    11/19/20 1432   BP: 126/64   BP Location: Left arm   Patient Position: Sitting   Pulse: 71   SpO2: 97%   Weight: 116 kg (256 lb 6.4 oz)   Height: 175.3 cm (69\")       Physical Exam:  General: Alert and oriented.  Neck: Jugular venous pressure is within normal limits. Carotids have normal upstrokes without bruits.   Cardiovascular: Heart has a nondisplaced focal PMI. Regular rate and rhythm. No murmur, gallop or " rub.  Lungs: Clear, no rales or wheezes. Equal expansion is noted.   Extremities: Show no edema.  Skin: Warm and dry.  Neurologic: Nonfocal.     Diagnostic Data (reviewed with patient):  Lab date: 11/13/2019  • FLP: TC 84, , HDL 31, LDL 6.0  • CMP: Glu 176, BUN 22, Creat 1.4, eGFR 57.3, Na 138, K 4.5, Cl 99, CO2 91, Ca 9.4, Alk Phos 61, AST 25, ALT 22  • CBC: WBC 8.6, RBC 4.36, HGB 13.1, HCT 39.6, MCV 90.8, MCH 30.1,        Procedures     [employer] Dual ICD:  DDDR 60, RA 16%, RV < 1%, P > 5 mV 0.62 at 0.5 ms, 460 ohms.  R 11.7 mV, 0.5 V at 0.5 ms, 630 ohms.  3.4-3.9 years.  NSVT x 5 longest 8 sec.AMS , 1%, longest 6 min      Assessment:    ICD-10-CM ICD-9-CM   1. Nonischemic cardiomyopathy (CMS/[facility])  I42.8 425.4   2. Mixed hyperlipidemia  E78.2 272.2   3. Essential hypertension  I10 401.9         Plan:  1. Continue lisinopril 5mg and metoprolol succinate XL 25mg for nonischemic cardiomyopathy.   2. Continue rosuvastatin 20mg for hyperlipidemia.   3. Continue amlodipine 5mg for hypertension.   4. Continue all other current medications.  5. F/up in 12 months, sooner if needed.    Scribed for Candida Campbell MD by Víctor Banerjee. 11/19/2020  14:43 EST     I Candida Campbell MD personally performed the services described in this documentation as scribed by the above individual in my presence, and it is both accurate and complete.    Candida Campbell MD, [facility]

## 2021-02-15 RX ORDER — LISINOPRIL 5 MG/1
TABLET ORAL
Qty: 90 TABLET | Refills: 1 | Status: SHIPPED | OUTPATIENT
Start: 2021-02-15 | End: 2021-08-23

## 2021-08-23 RX ORDER — LISINOPRIL 5 MG/1
TABLET ORAL
Qty: 60 TABLET | Refills: 0 | Status: SHIPPED | OUTPATIENT
Start: 2021-08-23 | End: 2021-09-27

## 2021-09-27 RX ORDER — LISINOPRIL 5 MG/1
TABLET ORAL
Qty: 90 TABLET | Refills: 0 | Status: SHIPPED | OUTPATIENT
Start: 2021-09-27 | End: 2022-01-28

## 2021-10-20 NOTE — PROGRESS NOTES
[facility] Card[facility]    Patient ID: Ren Mario is a 61 y.o. male.  : 1960   Contact: Home phone not available    Encounter date: 10/21/2021    PCP: Heidi Ellington APRN      Chief complaint:   Chief Complaint   Patient presents with   • Nonischemic cardiomyopathy (CMS/HCC)       Problem List:  1. Nonischemic cardiomyopathy:  a. Abnormal echocardiogram, 2014: EF 25-30%. Mild-to-moderate MR, trace TR, and mild diastolic dysfunction.  b. Abnormal Cardiolite GXT, 2014, [facility]: EF 25%. Evidence of potential ischemia in the anterolateral and inferior myocardial segments.  c. LHC, 2014, [facility]: Non-critical CAD. EF 25-30%.  d. Medical management with initiation of ACE inhibitor therapy (patient was already on beta blockade).  e. Echocardiogram, 2014: EF 20%. Ttrace MR and trace TR.   f. Dual-chamber ICD placed 2015, by Dr. Leroy Priest using a [employer] Ellipse DR. quintero. Echocardiogram, 2016: EF 45-50%. Trace MR and trace TR.   h. Echocardiogram, 2019: EF 55%. Borderline LV enlargement.  2. Hypertension.   3. Hyperlipidemia.  4. Obesity with BMI 39.  5. Type 2 DM.  6. Hypothyroidism dx 2018  7. Chronic low back pain.  8. Former tobacco abuse, cessation 15+ years ago.  9. Surgical history:  a. Left arm fracture at age [age].    Allergies   Allergen Reactions   • Lipitor [Atorvastatin]      muscle ache and fatigue.   • Naprosyn [Naproxen]        Current Medications:    Current Outpatient Medications:   •  allopurinol (ZYLOPRIM) 300 MG tablet, Take 300 mg by mouth Daily., Disp: , Rfl:   •  amLODIPine (NORVASC) 5 MG tablet, Take 5 mg by mouth Daily., Disp: , Rfl:   •  ASPIRIN LOW DOSE 81 MG EC tablet, Take 81 mg by mouth Daily., Disp: , Rfl: 1  •  colchicine 0.6 MG tablet, Take 0.6 mg by mouth As Needed., Disp: , Rfl: 2  •  cyclobenzaprine (FLEXERIL) 10 MG tablet, Take 10 mg by mouth As Needed., Disp: , Rfl:   •  furosemide (LASIX) 40 MG  "tablet, Take 40 mg by mouth Daily., Disp: , Rfl:   •  JANUVIA 100 MG tablet, Take 100 mg by mouth Daily., Disp: , Rfl:   •  levothyroxine (SYNTHROID, LEVOTHROID) 50 MCG tablet, Take 50 mcg by mouth Daily., Disp: , Rfl:   •  lisinopril (PRINIVIL,ZESTRIL) 5 MG tablet, TAKE ONE TABLET BY MOUTH DAILY, Disp: 90 tablet, Rfl: 0  •  magnesium gluconate (MAGONATE) 500 MG tablet, Take 27 mg by mouth Daily., Disp: , Rfl:   •  metFORMIN (GLUCOPHAGE) 1000 MG tablet, Take 1,000 mg by mouth 2 (Two) Times a Day., Disp: , Rfl: 2  •  metoprolol succinate XL (TOPROL-XL) 25 MG 24 hr tablet, Take 50 mg by mouth 2 (Two) Times a Day., Disp: , Rfl: 2  •  potassium chloride (K-DUR,KLOR-CON) 20 MEQ CR tablet, Take 20 mEq by mouth Daily., Disp: , Rfl:   •  rosuvastatin (CRESTOR) 20 MG tablet, Take 20 mg by mouth Daily., Disp: , Rfl: 1  •  traMADol (ULTRAM) 50 MG tablet, Take 50 mg by mouth 2 (two) times a day., Disp: , Rfl:   •  vitamin D (ERGOCALCIFEROL) 1.25 MG (79375 UT) capsule capsule, Take 50,000 Units by mouth 1 (One) Time Per Week., Disp: , Rfl:     HPI    Ren Mario is a 61 y.o. male who presents today for an annual follow up of nonischemic cardiomyopathy and cardiac risk factors. Since last visit, she is doing well from a cardiovascular standpoint.  He denies PND, orthopnea, chest pain, edema, shortness of breath.  He denies any ICD discharges.  Blood pressures well controlled.  Fasting lipid panel 10/15/2021 LDL of 5 and HDL of 25.      The following portions of the patient's history were reviewed and updated as appropriate: allergies, current medications and problem list.    Pertinent positives as listed in the HPI.  All other systems reviewed are negative.         Vitals:    10/21/21 1135   BP: 110/60   BP Location: Left arm   Patient Position: Sitting   Pulse: 72   Weight: 118 kg (260 lb)   Height: 175.3 cm (69\")       Physical Exam:  General: Alert and oriented.  Neck: Jugular venous pressure is within normal limits. " Carotids have normal upstrokes without bruits.   Cardiovascular: Heart has a nondisplaced focal PMI. Regular rate and rhythm. No murmur, gallop or rub.  Lungs: Clear, no rales or wheezes. Equal expansion is noted.   Extremities: Show no edema.  Skin: Warm and dry.  Neurologic: Nonfocal.     Diagnostic Data (reviewed with patient):    Lab date: 4/15/2021  • FLP: TC 94, , HDL 29, LDL 2  • CMP: Glu 156.3, BUN 13, Creat 1.3, eGFR 60.7, Na 135, K 5.0, Cl 99, CO2 26, Ca 8.9, Alk Phos 72, AST 85, ALT 70  • CBC: WBC 8.6, RBC 4.63, HGB 13.1, HCT 39.6, MCV 85.5, MCH 28.3,   • HbA1c: 7.3    Device check 10/21/2021: DDD or ICD, RA 13%, RV less than 1%, battery life 3 years, 15 SVT episodes longest of 2 hours.  Normal threshold and impedance.    Procedures      Assessment:    ICD-10-CM ICD-9-CM   1. Nonischemic cardiomyopathy (HCC)  I42.8 425.4   2. Essential hypertension  I10 401.9   3. Mixed hyperlipidemia  E78.2 272.2         Plan:  1. Patient was counseled to begin aerobic exercise 30 min per day for at least 4 days per week.   2. Continue on aspirin 81 mg for antiplatelet therapy.   3. Continue on amlodipine 5 mg daily, metoprolol 25 mg BID, and lisinopril 5 mg daily for hypertension.   4. Continue on rosuvastatin 20 mg daily for hyperlipidemia.   5. Continue on Lasix 40 mg daily for fluid retention.   6. Continue all other current medications.  7. F/up in 6 months, sooner if needed.      Electronically signed by DIEUDONNE Crain, 10/21/21, 11:52 AM EDT.

## 2021-10-21 ENCOUNTER — OFFICE VISIT (OUTPATIENT)
Dept: CARDIOLOGY | Facility: CLINIC | Age: 61
End: 2021-10-21

## 2021-10-21 VITALS
SYSTOLIC BLOOD PRESSURE: 110 MMHG | HEART RATE: 72 BPM | DIASTOLIC BLOOD PRESSURE: 60 MMHG | WEIGHT: 260 LBS | HEIGHT: 69 IN | BODY MASS INDEX: 38.51 KG/M2

## 2021-10-21 DIAGNOSIS — I42.8 NONISCHEMIC CARDIOMYOPATHY (HCC): Primary | ICD-10-CM

## 2021-10-21 DIAGNOSIS — E78.2 MIXED HYPERLIPIDEMIA: ICD-10-CM

## 2021-10-21 DIAGNOSIS — I10 ESSENTIAL HYPERTENSION: ICD-10-CM

## 2021-10-21 PROCEDURE — 99213 OFFICE O/P EST LOW 20 MIN: CPT | Performed by: NURSE PRACTITIONER

## 2021-10-21 PROCEDURE — 93283 PRGRMG EVAL IMPLANTABLE DFB: CPT | Performed by: NURSE PRACTITIONER

## 2021-10-21 RX ORDER — ERGOCALCIFEROL 1.25 MG/1
50000 CAPSULE ORAL WEEKLY
COMMUNITY
Start: 2021-09-21

## 2021-10-21 RX ORDER — LEVOTHYROXINE SODIUM 0.05 MG/1
50 TABLET ORAL DAILY
COMMUNITY
Start: 2021-10-11

## 2021-10-21 RX ORDER — TRAMADOL HYDROCHLORIDE 50 MG/1
50 TABLET ORAL 2 TIMES DAILY
COMMUNITY
Start: 2021-10-15

## 2022-01-28 RX ORDER — LISINOPRIL 5 MG/1
TABLET ORAL
Qty: 90 TABLET | Refills: 0 | Status: SHIPPED | OUTPATIENT
Start: 2022-01-28 | End: 2022-04-27

## 2022-04-21 ENCOUNTER — OFFICE VISIT (OUTPATIENT)
Dept: CARDIOLOGY | Facility: CLINIC | Age: 62
End: 2022-04-21

## 2022-04-21 ENCOUNTER — HOSPITAL ENCOUNTER (OUTPATIENT)
Facility: HOSPITAL | Age: 62
Discharge: HOME | End: 2022-04-21
Payer: COMMERCIAL

## 2022-04-21 VITALS
HEIGHT: 69 IN | OXYGEN SATURATION: 98 % | WEIGHT: 252 LBS | DIASTOLIC BLOOD PRESSURE: 60 MMHG | SYSTOLIC BLOOD PRESSURE: 110 MMHG | BODY MASS INDEX: 37.33 KG/M2 | HEART RATE: 64 BPM

## 2022-04-21 DIAGNOSIS — E78.2 MIXED HYPERLIPIDEMIA: ICD-10-CM

## 2022-04-21 DIAGNOSIS — I10 ESSENTIAL HYPERTENSION: ICD-10-CM

## 2022-04-21 DIAGNOSIS — I42.8 NONISCHEMIC CARDIOMYOPATHY: Primary | ICD-10-CM

## 2022-04-21 PROCEDURE — 93000 ELECTROCARDIOGRAM COMPLETE: CPT | Performed by: INTERNAL MEDICINE

## 2022-04-21 PROCEDURE — 93005 ELECTROCARDIOGRAM TRACING: CPT

## 2022-04-21 PROCEDURE — 99214 OFFICE O/P EST MOD 30 MIN: CPT | Performed by: INTERNAL MEDICINE

## 2022-04-21 RX ORDER — ROSUVASTATIN CALCIUM 10 MG/1
10 TABLET, COATED ORAL DAILY
Qty: 90 TABLET | Refills: 3 | Status: SHIPPED | OUTPATIENT
Start: 2022-04-21

## 2022-04-21 NOTE — PROGRESS NOTES
[facility]    Patient ID: Ren Mario is a 61 y.o. male.  : 1960   Contact: [phone]    Encounter date: 2022    PCP: Heidi Ellington APRN      Chief complaint:   Chief Complaint   Patient presents with   • Nonischemic cardiomyopathy (HCC)       Problem List:  1. Nonischemic cardiomyopathy:  a. Abnormal echocardiogram, 2014: EF 25-30%. Mild-to-moderate MR, trace TR, and mild diastolic dysfunction.  b. Abnormal Cardiolite GXT, 2014, [facility]: EF 25%. Evidence of potential ischemia in the anterolateral and inferior myocardial segments.  c. LHC, 2014, [facility]: Non-critical CAD. EF 25-30%.  d. Medical management with initiation of ACE inhibitor therapy (patient was already on beta blockade).  e. Echocardiogram, 2014: EF 20%. Ttrace MR and trace TR.   f. Dual-chamber ICD placed 2015, by Dr. Leroy Priest using a [employer] Ellipse DR. quintero. Echocardiogram, 2016: EF 45-50%. Trace MR and trace TR.   h. Echocardiogram, 2019: EF 55%. Borderline LV enlargement.  2. Hypertension.   3. Hyperlipidemia.  4. Obesity with BMI 39.  5. Type 2 DM.  6. Hypothyroidism dx 2018  7. Chronic low back pain.  8. Former tobacco abuse, cessation 15+ years ago.  9. Surgical history:  a. Left arm fracture at age [age].    Allergies   Allergen Reactions   • Lipitor [Atorvastatin] Myalgia     muscle ache and fatigue.   • Naprosyn [Naproxen] Unknown - Low Severity       Current Medications:    Current Outpatient Medications:   •  allopurinol (ZYLOPRIM) 300 MG tablet, Take 300 mg by mouth Daily., Disp: , Rfl:   •  amLODIPine (NORVASC) 5 MG tablet, Take 5 mg by mouth Daily., Disp: , Rfl:   •  ASPIRIN LOW DOSE 81 MG EC tablet, Take 81 mg by mouth Daily., Disp: , Rfl: 1  •  colchicine 0.6 MG tablet, Take 0.6 mg by mouth As Needed., Disp: , Rfl: 2  •  cyclobenzaprine (FLEXERIL) 10 MG tablet, Take 10 mg by mouth As Needed., Disp: , Rfl:   •  furosemide  "(LASIX) 40 MG tablet, Take 40 mg by mouth Daily., Disp: , Rfl:   •  JANUVIA 100 MG tablet, Take 100 mg by mouth Daily., Disp: , Rfl:   •  levothyroxine (SYNTHROID, LEVOTHROID) 50 MCG tablet, Take 50 mcg by mouth Daily., Disp: , Rfl:   •  lisinopril (PRINIVIL,ZESTRIL) 5 MG tablet, TAKE ONE TABLET BY MOUTH DAILY, Disp: 90 tablet, Rfl: 0  •  magnesium gluconate (MAGONATE) 500 MG tablet, Take 27 mg by mouth Daily., Disp: , Rfl:   •  metFORMIN (GLUCOPHAGE) 1000 MG tablet, Take 1,000 mg by mouth 2 (Two) Times a Day., Disp: , Rfl: 2  •  metoprolol succinate XL (TOPROL-XL) 25 MG 24 hr tablet, Take 50 mg by mouth 2 (Two) Times a Day., Disp: , Rfl: 2  •  potassium chloride (K-DUR,KLOR-CON) 20 MEQ CR tablet, Take 20 mEq by mouth Daily., Disp: , Rfl:   •  rosuvastatin (CRESTOR) 20 MG tablet, Take 20 mg by mouth Daily., Disp: , Rfl: 1  •  traMADol (ULTRAM) 50 MG tablet, Take 50 mg by mouth 2 (two) times a day., Disp: , Rfl:   •  vitamin D (ERGOCALCIFEROL) 1.25 MG (93059 UT) capsule capsule, Take 50,000 Units by mouth 1 (One) Time Per Week., Disp: , Rfl:     HPI    Ren Mario is a 61 y.o. male who presents today for a 6 month follow up of nonischemic cardiomyopathy and cardiac risk factors. Since last visit, the patient has been doing well overall from a cardiovascular standpoint. He states his monitor for his device at his home is not currently working due to changes in the Internet. Patient denies chest pain, shortness of breath, orthopnea, palpitations, edema, dizziness, and syncope.     The following portions of the patient's history were reviewed and updated as appropriate: allergies, current medications and problem list.    Pertinent positives as listed in the HPI.  All other systems reviewed are negative.         Vitals:    04/21/22 1518   BP: 110/60   BP Location: Left arm   Patient Position: Sitting   Pulse: 64   SpO2: 98%   Weight: 114 kg (252 lb)   Height: 175.3 cm (69\")       Physical Exam:  General: Alert and " oriented.  Neck: Jugular venous pressure is within normal limits. Carotids have normal upstrokes without bruits.   Cardiovascular: Heart has a nondisplaced focal PMI. Regular rate and rhythm. No murmur, gallop or rub.  Lungs: Clear, no rales or wheezes. Equal expansion is noted.   Extremities: Show no edema.  Skin: Warm and dry.  Neurologic: Nonfocal.     Diagnostic Data (reviewed with patient):    Lab date: 10/15/2021  • FLP: TC 84, , HDL 25, LDL 5  • CMP: Glu 202.7, BUN 14, Creat 1.4, eGFR 53.3, Na 134.0, K 5.1, Cl 97.0, CO2 30, Ca 9.4, Alk Phos 77, AST 78, ALT 60  • CBC: WBC 10.5, RBC 4.55, HGB 13.1, HCT 40.1, MCV 88.1, MCH 28.8, .0  • HbA1c: 9.9      ECG 12 Lead    Date/Time: 4/21/2022 3:55 PM  Performed by: Candida Campbell MD  Authorized by: Candida Campbell MD   Comparison: compared with previous ECG from 4/15/2021  Similar to previous ECG  Comparison to previous ECG: Now atrial-paced rhythm   Rhythm: paced  BPM: 63  Other findings: T wave abnormality    Clinical impression: abnormal EKG              Assessment:    ICD-10-CM ICD-9-CM   1. Nonischemic cardiomyopathy (HCC)  I42.8 425.4   2. Essential hypertension  I10 401.9   3. Mixed hyperlipidemia  E78.2 272.2         Plan:  1. Decrease rosuvastatin from 20 mg to 10 mg daily for hyperlipidemia.   2. Follow up in 2 weeks for a device check.   3. Continue on amlodipine 5 mg daily for hypertension.   4. Continue on aspirin 81 mg for antiplatelet therapy.   5. Continue on Lasix 40 mg daily for fluid retention.   6. Continue on lisinopril 5 mg daily for hypertension. .   7. Continue on metoprolol 50 mg BID for rate control and hypertension.   8. Continue on potassium 20 mEq CR daily for replacement therapy.   9. Continue all other current medications.  10. F/up in 6 months with device check, sooner if needed.    Scribed for Candida Campbell MD by Naz Schafer. 4/21/2022 15:43 EDT      I Candida Campbell MD personally  performed the services described in this documentation as scribed by the above individual in my presence, and it is both accurate and complete.    Candida Campbell MD, FACC

## 2022-04-27 RX ORDER — LISINOPRIL 5 MG/1
TABLET ORAL
Qty: 90 TABLET | Refills: 3 | Status: SHIPPED | OUTPATIENT
Start: 2022-04-27

## 2022-11-16 NOTE — PROGRESS NOTES
[facility] Cardiology    Patient ID: Ren Mario is a 62 y.o. male.  : 1960   Contact: [phone]    Encounter date: 2022    PCP: Heidi Ellington APRN      Chief complaint:   Chief Complaint   Patient presents with   • Nonischemic cardiomyopathy (HCC)       Problem List:  1. Nonischemic cardiomyopathy:  a. Abnormal echocardiogram, 2014: EF 25-30%. Mild-to-moderate MR, trace TR, and mild diastolic dysfunction.  b. Abnormal Cardiolite GXT, 2014, [facility]: EF 25%. Evidence of potential ischemia in the anterolateral and inferior myocardial segments.  c. LHC, 2014, [facility]: Non-critical CAD. EF 25-30%.  d. Medical management with initiation of ACE inhibitor therapy (patient was already on beta blockade).  e. Echocardiogram, 2014: EF 20%. Ttrace MR and trace TR.   f. Dual-chamber ICD placed 2015, by Dr. Leroy Priest using a [employer] Ellipse DR. quintero. Echocardiogram, 2016: EF 45-50%. Trace MR and trace TR.   h. Echocardiogram, 2019: EF 55%. Borderline LV enlargement.  2. Ventricular tachycardia/NSVT  1. One episode of NSVT lasting 27 beats on 2022  2. One episode of VT lasting 33 beats on 10/12/2022.  Patient asymptomatic and did not receive shock from device.   3. Hypertension.   4. Hyperlipidemia.  5. Obesity with BMI 39.  6. Type 2 DM.  7. Hypothyroidism dx   8. Chronic low back pain.  9. Former tobacco abuse, cessation 15+ years ago.  10. Surgical history:  a. Left arm fracture at age [age].       Allergies   Allergen Reactions   • Lipitor [Atorvastatin] Myalgia     muscle ache and fatigue.   • Naprosyn [Naproxen] Unknown - Low Severity       Current Medications:    Current Outpatient Medications:   •  allopurinol (ZYLOPRIM) 300 MG tablet, Take 300 mg by mouth Daily., Disp: , Rfl:   •  amLODIPine (NORVASC) 5 MG tablet, Take 5 mg by mouth Daily., Disp: , Rfl:   •  ASPIRIN LOW DOSE 81 MG EC tablet, Take 81 mg by mouth  Daily., Disp: , Rfl: 1  •  colchicine 0.6 MG tablet, Take 0.6 mg by mouth As Needed., Disp: , Rfl: 2  •  cyclobenzaprine (FLEXERIL) 10 MG tablet, Take 10 mg by mouth As Needed., Disp: , Rfl:   •  furosemide (LASIX) 40 MG tablet, Take 40 mg by mouth Daily., Disp: , Rfl:   •  glipizide (GLUCOTROL) 5 MG tablet, Daily., Disp: , Rfl:   •  JANUVIA 100 MG tablet, Take 100 mg by mouth Daily., Disp: , Rfl:   •  latanoprost (XALATAN) 0.005 % ophthalmic solution, Daily., Disp: , Rfl:   •  levothyroxine (SYNTHROID, LEVOTHROID) 50 MCG tablet, Take 50 mcg by mouth Daily., Disp: , Rfl:   •  lisinopril (PRINIVIL,ZESTRIL) 5 MG tablet, TAKE ONE TABLET BY MOUTH DAILY, Disp: 90 tablet, Rfl: 3  •  magnesium gluconate (MAGONATE) 500 MG tablet, Take 27 mg by mouth Daily., Disp: , Rfl:   •  metFORMIN (GLUCOPHAGE) 1000 MG tablet, Take 1,000 mg by mouth 2 (Two) Times a Day., Disp: , Rfl: 2  •  metoprolol succinate XL (TOPROL-XL) 25 MG 24 hr tablet, Take 50 mg by mouth 2 (Two) Times a Day., Disp: , Rfl: 2  •  potassium chloride (K-DUR,KLOR-CON) 20 MEQ CR tablet, Take 20 mEq by mouth Daily., Disp: , Rfl:   •  rosuvastatin (CRESTOR) 10 MG tablet, Take 1 tablet by mouth Daily., Disp: 90 tablet, Rfl: 3  •  traMADol (ULTRAM) 50 MG tablet, Take 50 mg by mouth 2 (two) times a day., Disp: , Rfl:   •  vitamin D (ERGOCALCIFEROL) 1.25 MG (01144 UT) capsule capsule, Take 50,000 Units by mouth 1 (One) Time Per Week., Disp: , Rfl:     HPI    Ren Mario is a 62 y.o. male who presents today for a follow up of nonischemic cardiomyopathy and cardiac risk factors. Since last visit, the patient has been doing well from a cardiac standpoint.  Complains of chronic back pain.  Denies chest pain, shortness of breath, palpitations, lower extremity edema, lightheadedness or syncope.  One episode of VT lasting 33 beats on 10/12/2022 and one episode of NSVT lasting 27 beats on 9/11/2022.  Continues to have issues with home monitoring due to bad cell service.   "      The following portions of the patient's history were reviewed and updated as appropriate: allergies, current medications and problem list.    Pertinent positives as listed in the HPI.  All other systems reviewed are negative.         Vitals:    11/17/22 0916   BP: 114/60   BP Location: Left arm   Patient Position: Sitting   Pulse: 70   SpO2: 96%   Weight: 116 kg (256 lb)   Height: 175.3 cm (69\")       Physical Exam:  General: Alert and oriented.  Neck: Jugular venous pressure is within normal limits. Carotids have normal upstrokes without bruits.   Cardiovascular: Heart has a nondisplaced focal PMI. Regular rate and rhythm. No murmur, gallop or rub.  Lungs: Clear, no rales or wheezes. Equal expansion is noted.   Extremities: Show no edema.  Skin: Warm and dry.  Neurologic: Nonfocal.     Diagnostic Data (reviewed with patient):    Lab date: 01/28/2022  • FLP: TC 89.1, .8, HDL 23.9, LDL 0.2  • CMP: Glu 97.2, BUN 21.1, Creat 1.4, eGFR 66, Na 138.3, K 4.2, Cl 101.2, CO2 28.7, Ca 9.4, Alk Phos 67.6, AST 48, ALT 29.4  • CBC: WBC 9.1, RBC 4.27, HGB 12.2, HCT 38.1, MCV 89.2, MCH 28.6,        Procedures      Assessment:    ICD-10-CM ICD-9-CM   1. Nonischemic cardiomyopathy (HCC)  I42.8 425.4   2. Essential hypertension  I10 401.9   3. Mixed hyperlipidemia  E78.2 272.2   4. NSVT (nonsustained ventricular tachycardia)  I47.29 427.1     DEVICE INTERROGATION:  [employer], Interrogation date 11/17/2022- RA pacing 18%, RV pacing <1%. P wave is 75.0mV with threshold of 0.625V @ 0.5 msec and an impedance of 460 ohms.  R wave is 11.7 mV with threshold of 0.5V @ 0.5 msec and an impedance of 530 ohms. Battery voltage is 1.6-1.9 years.  One VT episode lasting 33 beats @ 200 bpm on 10/12/2022. One episode of NSVT lasting 27 beats @ 182 bpm on 9/11/2022.  AMS < 1%,  No real AT/AF.             Plan:  1. Device check with one short run of VT and one short run of NSVT.  Patient asymptomatic and did not receive shock from his " device.  2. Check magnesium level today. Lab order printed and given to patient today.   3. Continue on amlodipine 5 mg daily and lisinopril 5 mg daily for hypertension.  4. Continue on Lasix 40 mg daily for fluid retention.  5. Continue on metoprolol 25 mg daily for rate control.  6. Continue on rosuvastatin 10 mg daily for hyperlipidemia.  7. Continue all other current medications.  8. F/up in 6 months, sooner if needed.      Electronically signed by DIEUDONNE Sharpe, 11/17/22, 9:47 AM EST.

## 2022-11-17 ENCOUNTER — HOSPITAL ENCOUNTER (OUTPATIENT)
Facility: HOSPITAL | Age: 62
Discharge: HOME | End: 2022-11-17
Payer: COMMERCIAL

## 2022-11-17 ENCOUNTER — OFFICE VISIT (OUTPATIENT)
Dept: CARDIOLOGY | Facility: CLINIC | Age: 62
End: 2022-11-17

## 2022-11-17 VITALS
SYSTOLIC BLOOD PRESSURE: 114 MMHG | BODY MASS INDEX: 37.92 KG/M2 | WEIGHT: 256 LBS | DIASTOLIC BLOOD PRESSURE: 60 MMHG | HEART RATE: 70 BPM | OXYGEN SATURATION: 96 % | HEIGHT: 69 IN

## 2022-11-17 DIAGNOSIS — E78.2 MIXED HYPERLIPIDEMIA: ICD-10-CM

## 2022-11-17 DIAGNOSIS — I10 ESSENTIAL HYPERTENSION: ICD-10-CM

## 2022-11-17 DIAGNOSIS — I47.29 NSVT (NONSUSTAINED VENTRICULAR TACHYCARDIA): ICD-10-CM

## 2022-11-17 DIAGNOSIS — I42.8 NONISCHEMIC CARDIOMYOPATHY: Primary | ICD-10-CM

## 2022-11-17 LAB
ANION GAP SERPL CALCULATED.3IONS-SCNC: 12 MMOL/L (ref 3–16)
BUN BLDV-MCNC: 11 MG/DL (ref 6–20)
CALCIUM SERPL-MCNC: 9.5 MG/DL (ref 8.5–10.5)
CHLORIDE BLD-SCNC: 99 MMOL/L (ref 98–107)
CO2: 28 MMOL/L (ref 20–30)
CREAT SERPL-MCNC: 1.4 MG/DL (ref 0.4–1.2)
GFR SERPL CREATININE-BSD FRML MDRD: 56 ML/MIN/{1.73_M2}
GLUCOSE BLD-MCNC: 147 MG/DL (ref 74–106)
MAGNESIUM: 2 MG/DL (ref 1.7–2.4)
POTASSIUM SERPL-SCNC: 5 MMOL/L (ref 3.4–5.1)
SODIUM BLD-SCNC: 139 MMOL/L (ref 136–145)

## 2022-11-17 PROCEDURE — 36415 COLL VENOUS BLD VENIPUNCTURE: CPT

## 2022-11-17 PROCEDURE — 80048 BASIC METABOLIC PNL TOTAL CA: CPT

## 2022-11-17 PROCEDURE — 99213 OFFICE O/P EST LOW 20 MIN: CPT | Performed by: INTERNAL MEDICINE

## 2022-11-17 PROCEDURE — 83735 ASSAY OF MAGNESIUM: CPT

## 2022-11-17 RX ORDER — GLIPIZIDE 5 MG/1
TABLET ORAL DAILY
COMMUNITY
Start: 2022-11-14

## 2022-11-17 RX ORDER — LATANOPROST 50 UG/ML
SOLUTION/ DROPS OPHTHALMIC DAILY
COMMUNITY
Start: 2022-10-31

## 2022-11-18 DIAGNOSIS — I47.29 NSVT (NONSUSTAINED VENTRICULAR TACHYCARDIA): ICD-10-CM

## 2022-12-09 ENCOUNTER — TELEPHONE (OUTPATIENT)
Dept: CARDIOLOGY | Facility: CLINIC | Age: 62
End: 2022-12-09

## 2022-12-09 DIAGNOSIS — Z79.899 LONG-TERM USE OF HIGH-RISK MEDICATION: Primary | ICD-10-CM

## 2023-04-24 RX ORDER — LISINOPRIL 5 MG/1
TABLET ORAL
Qty: 30 TABLET | Refills: 1 | Status: SHIPPED | OUTPATIENT
Start: 2023-04-24

## 2023-05-01 RX ORDER — ROSUVASTATIN CALCIUM 10 MG/1
10 TABLET, COATED ORAL DAILY
Qty: 30 TABLET | Refills: 3 | Status: SHIPPED | OUTPATIENT
Start: 2023-05-01

## 2023-05-17 NOTE — PROGRESS NOTES
[facility] Cardiology    Patient ID: Ren Mario is a 62 y.o. male.  : 1960   Contact: [phone]    Encounter date: 2023    PCP: Heidi Ellington APRN      Chief complaint:   Chief Complaint   Patient presents with   • Nonischemic cardiomyopathy       Problem List:  1. Nonischemic cardiomyopathy:  a. Abnormal echocardiogram, 2014: EF 25-30%. Mild-to-moderate MR, trace TR, and mild diastolic dysfunction.  b. Abnormal Cardiolite GXT, 2014, [facility]: EF 25%. Evidence of potential ischemia in the anterolateral and inferior myocardial segments.  c. LHC, 2014, [facility]: Non-critical CAD. EF 25-30%.  d. Medical management with initiation of ACE inhibitor therapy (patient was already on beta blockade).  e. Echocardiogram, 2014: EF 20%. Ttrace MR and trace TR.   f. Dual-chamber ICD placed 2015, by Dr. Leroy Priest using a [employer] Ellipse DRJaylin quintero. Echocardiogram, 2016: EF 45-50%. Trace MR and trace TR.   h. Echocardiogram, 2019: EF 55%. Borderline LV enlargement.  2. Ventricular tachycardia/NSVT  1. One episode of NSVT lasting 27 beats on 2022  2. One episode of VT lasting 33 beats on 10/12/2022.  Patient asymptomatic and did not receive shock from device.   3. Hypertension.   4. Hyperlipidemia.  5. Obesity with BMI 39.  6. Type 2 DM.  7. Hypothyroidism dx   8. Chronic low back pain.  9. Former tobacco abuse, cessation 15+ years ago.  10. Surgical history:  a. Left arm fracture at age [age].    Allergies   Allergen Reactions   • Lipitor [Atorvastatin] Myalgia     muscle ache and fatigue.   • Naprosyn [Naproxen] Unknown - Low Severity       Current Medications:    Current Outpatient Medications:   •  allopurinol (ZYLOPRIM) 300 MG tablet, Take 1 tablet by mouth Daily., Disp: , Rfl:   •  amLODIPine (NORVASC) 5 MG tablet, Take 1 tablet by mouth Daily., Disp: , Rfl:   •  ASPIRIN LOW DOSE 81 MG EC tablet, Take 1 tablet by mouth  Daily., Disp: , Rfl: 1  •  colchicine 0.6 MG tablet, Take 1 tablet by mouth As Needed., Disp: , Rfl: 2  •  cyclobenzaprine (FLEXERIL) 10 MG tablet, Take 1 tablet by mouth As Needed., Disp: , Rfl:   •  furosemide (LASIX) 40 MG tablet, Take 1 tablet by mouth Daily., Disp: , Rfl:   •  glipizide (GLUCOTROL) 5 MG tablet, Daily., Disp: , Rfl:   •  JANUVIA 100 MG tablet, Take 1 tablet by mouth Daily., Disp: , Rfl:   •  latanoprost (XALATAN) 0.005 % ophthalmic solution, Daily., Disp: , Rfl:   •  levothyroxine (SYNTHROID, LEVOTHROID) 50 MCG tablet, Take 1 tablet by mouth Daily., Disp: , Rfl:   •  lisinopril (PRINIVIL,ZESTRIL) 5 MG tablet, TAKE ONE TABLET BY MOUTH DAILY, Disp: 30 tablet, Rfl: 1  •  magnesium gluconate (MAGONATE) 500 MG tablet, Take 1 tablet by mouth Daily., Disp: , Rfl:   •  metFORMIN (GLUCOPHAGE) 1000 MG tablet, Take 1 tablet by mouth 2 (Two) Times a Day., Disp: , Rfl: 2  •  metoprolol succinate XL (TOPROL-XL) 25 MG 24 hr tablet, Take 2 tablets by mouth 2 (Two) Times a Day., Disp: , Rfl: 2  •  potassium chloride (K-DUR,KLOR-CON) 20 MEQ CR tablet, Take 1 tablet by mouth Daily., Disp: , Rfl:   •  rosuvastatin (CRESTOR) 10 MG tablet, TAKE 1 TABLET BY MOUTH DAILY, Disp: 30 tablet, Rfl: 3  •  traMADol (ULTRAM) 50 MG tablet, Take 1 tablet by mouth 2 (two) times a day., Disp: , Rfl:   •  vitamin D (ERGOCALCIFEROL) 1.25 MG (44736 UT) capsule capsule, Take 1 capsule by mouth 1 (One) Time Per Week., Disp: , Rfl:     HPI    Ren Mario is a 62 y.o. male who presents today for a follow up of nonischemic cardiomyopathy and cardiac risk factors. Since last visit, the patient has been doing well overall from a cardiovascular standpoint. Patient states he tries to walk for exercise. He is limited due to discomfort in his left knee. He did attend rehab but notes his knee is worsening. Patient denies chest pain, shortness of breath, orthopnea, palpitations, edema, dizziness, and syncope.      The following portions of  "the patient's history were reviewed and updated as appropriate: allergies, current medications and problem list.    Pertinent positives as listed in the HPI.  All other systems reviewed are negative.         Vitals:    05/18/23 1309   BP: 104/52   BP Location: Left arm   Patient Position: Sitting   Pulse: 74   Weight: 113 kg (250 lb)   Height: 175.3 cm (69\")       Physical Exam:  General: Alert and oriented.  Neck: Jugular venous pressure is within normal limits. Carotids have normal upstrokes without bruits.   Cardiovascular: Heart has a nondisplaced focal PMI. Regular rate and rhythm. No murmur, gallop or rub.  Lungs: Clear, no rales or wheezes. Equal expansion is noted.   Extremities: Show no edema.  Skin: Warm and dry.  Neurologic: Nonfocal.     Diagnostic Data (reviewed with patient):    Lab date: 02/10/2023  • BMP: Glu 247.1, BUN 13.3, Creat 1.2, eGFR 65, Na 138, K 4.5, Cl 97.8, CO2 26.2, Ca 8.8    Lab date: 11/17/2022  • Magnesium: 2.0    Procedures        Assessment:    ICD-10-CM ICD-9-CM   1. NSVT (nonsustained ventricular tachycardia)  I47.29 427.1   2. Nonischemic cardiomyopathy  I42.8 425.4   3. Essential hypertension  I10 401.9   4. Mixed hyperlipidemia  E78.2 272.2         Plan:  1. Limited echocardiogram ordered to reassess ejection fraction due to nonischemic cardiomyopathy.    2. Continue on aspirin 81 mg daily for antiplatelet therapy.    3. Continue on Lasix 40 mg daily for fluid retention.    4. Continue on lisinopril 5 mg daily and amlodipine 5 mg daily for hypertension.    5. Continue on metoprolol 50 mg BID for rate control and hypertension.    6. Continue on rosuvastatin 10 mg daily for hyperlipidemia.    7. Continue all other current medications.  8. F/up in 6 months, sooner if needed.  9. Patient will come on 06/15/2023 at 10:30AM for a device check only.       Scribed for Candida Campbell MD by Naz Schafer. 5/18/2023 13:14 EDT         I Candida Campbell MD personally " performed the services described in this documentation as scribed by the above individual in my presence, and it is both accurate and complete.    Candida Campbell MD, FACC

## 2023-05-18 ENCOUNTER — OFFICE VISIT (OUTPATIENT)
Dept: CARDIOLOGY | Facility: CLINIC | Age: 63
End: 2023-05-18
Payer: COMMERCIAL

## 2023-05-18 VITALS
BODY MASS INDEX: 37.03 KG/M2 | HEART RATE: 74 BPM | HEIGHT: 69 IN | WEIGHT: 250 LBS | DIASTOLIC BLOOD PRESSURE: 52 MMHG | SYSTOLIC BLOOD PRESSURE: 104 MMHG

## 2023-05-18 DIAGNOSIS — E78.2 MIXED HYPERLIPIDEMIA: ICD-10-CM

## 2023-05-18 DIAGNOSIS — I47.29 NSVT (NONSUSTAINED VENTRICULAR TACHYCARDIA): Primary | ICD-10-CM

## 2023-05-18 DIAGNOSIS — I10 ESSENTIAL HYPERTENSION: ICD-10-CM

## 2023-05-18 DIAGNOSIS — I42.8 NONISCHEMIC CARDIOMYOPATHY: ICD-10-CM

## 2023-05-18 PROCEDURE — 3074F SYST BP LT 130 MM HG: CPT | Performed by: INTERNAL MEDICINE

## 2023-05-18 PROCEDURE — 99214 OFFICE O/P EST MOD 30 MIN: CPT | Performed by: INTERNAL MEDICINE

## 2023-05-18 PROCEDURE — 1160F RVW MEDS BY RX/DR IN RCRD: CPT | Performed by: INTERNAL MEDICINE

## 2023-05-18 PROCEDURE — 3078F DIAST BP <80 MM HG: CPT | Performed by: INTERNAL MEDICINE

## 2023-05-18 PROCEDURE — 1159F MED LIST DOCD IN RCRD: CPT | Performed by: INTERNAL MEDICINE

## 2023-05-26 ENCOUNTER — HOSPITAL ENCOUNTER (OUTPATIENT)
Dept: NON INVASIVE DIAGNOSTICS | Facility: HOSPITAL | Age: 63
Discharge: HOME | End: 2023-05-26
Payer: COMMERCIAL

## 2023-05-26 PROCEDURE — 93308 TTE F-UP OR LMTD: CPT

## 2023-08-25 DIAGNOSIS — E78.2 MIXED HYPERLIPIDEMIA: Primary | Chronic | ICD-10-CM

## 2023-08-25 RX ORDER — ROSUVASTATIN CALCIUM 10 MG/1
10 TABLET, COATED ORAL DAILY
Qty: 30 TABLET | Refills: 2 | Status: SHIPPED | OUTPATIENT
Start: 2023-08-25 | End: 2023-08-28

## 2023-08-25 NOTE — TELEPHONE ENCOUNTER
Last CMP dated 2/10/23  Glu 247  BUN 13.3  Creat 1.2  GFR 65  Calcium 8.8  Na 138  K+ 4.5  CL 97.8  CO2 26.2    Patient needs a lipid panel.  I will mail him the order.

## 2023-08-28 RX ORDER — ROSUVASTATIN CALCIUM 10 MG/1
10 TABLET, COATED ORAL DAILY
Qty: 30 TABLET | Refills: 1 | Status: SHIPPED | OUTPATIENT
Start: 2023-08-28

## 2023-12-18 RX ORDER — LISINOPRIL 5 MG/1
TABLET ORAL
Qty: 90 TABLET | Refills: 2 | Status: SHIPPED | OUTPATIENT
Start: 2023-12-18

## 2023-12-18 NOTE — TELEPHONE ENCOUNTER
Labs dated 2/10/23  Glu  247  BUN  13.3  Creat  1.2  GFR  65  Calcium 8.8  Na  138  K+  4.5  CL  97.8  CO2  26.2

## 2023-12-20 NOTE — PROGRESS NOTES
[facility] Cardiology    Patient ID: Ren Mario is a 63 y.o. male.  : 1960   Contact: [phone]    Encounter date: 2023    PCP: Heidi Ellington APRN      Chief complaint:   Chief Complaint   Patient presents with    NSVT (nonsustained ventricular tachycardia)    Shortness of Breath       Problem List:  Nonischemic cardiomyopathy:  Abnormal echocardiogram, 2014: EF 25-30%. Mild-to-moderate MR, trace TR, and mild diastolic dysfunction.  Abnormal Cardiolite GXT, 2014, [facility]: EF 25%. Evidence of potential ischemia in the anterolateral and inferior myocardial segments.  LHC, 2014, [facility]: Non-critical CAD. EF 25-30%.  Medical management with initiation of ACE inhibitor therapy (patient was already on beta blockade).  Echocardiogram, 2014: EF 20%. Ttrace MR and trace TR.   Dual-chamber ICD placed 2015, by Dr. Leroy Priest using a [employer] Ellipse DR.   Echocardiogram, 2016: EF 45-50%. Trace MR and trace TR.   Echocardiogram, 2019: EF 55%. Borderline LV enlargement.  Echocardiogram 2023: 50%  Ventricular tachycardia/NSVT  One episode of NSVT lasting 27 beats on 2022  One episode of VT lasting 33 beats on 10/12/2022.  Patient asymptomatic and did not receive shock from device.   Hypertension.   Hyperlipidemia.  Obesity with BMI 39.  Type 2 DM.  Hypothyroidism dx 2018  Chronic low back pain.  Former tobacco abuse, cessation 15+ years ago.  Surgical history:  Left arm fracture at age [age].    Allergies   Allergen Reactions    Lipitor [Atorvastatin] Myalgia     muscle ache and fatigue.    Naprosyn [Naproxen] Unknown - Low Severity       Current Medications:    Current Outpatient Medications:     allopurinol (ZYLOPRIM) 300 MG tablet, Take 1 tablet by mouth Daily., Disp: , Rfl:     amLODIPine (NORVASC) 5 MG tablet, Take 1 tablet by mouth Daily., Disp: , Rfl:     Arthritis Pain Relief 650 MG 8 hr tablet, Take 1 tablet by  mouth Every 8 (Eight) Hours As Needed., Disp: , Rfl:     ASPIRIN LOW DOSE 81 MG EC tablet, Take 1 tablet by mouth Daily., Disp: , Rfl: 1    colchicine 0.6 MG tablet, Take 1 tablet by mouth As Needed., Disp: , Rfl: 2    cyclobenzaprine (FLEXERIL) 10 MG tablet, Take 1 tablet by mouth As Needed., Disp: , Rfl:     furosemide (LASIX) 40 MG tablet, Take 1 tablet by mouth Daily., Disp: , Rfl:     glipizide (GLUCOTROL) 5 MG tablet, Daily., Disp: , Rfl:     JANUVIA 100 MG tablet, Take 1 tablet by mouth Daily., Disp: , Rfl:     latanoprost (XALATAN) 0.005 % ophthalmic solution, Daily., Disp: , Rfl:     levothyroxine (SYNTHROID, LEVOTHROID) 50 MCG tablet, Take 1 tablet by mouth Daily., Disp: , Rfl:     lisinopril (PRINIVIL,ZESTRIL) 5 MG tablet, TAKE ONE TABLET BY MOUTH DAILY, Disp: 90 tablet, Rfl: 2    magnesium gluconate (MAGONATE) 500 MG tablet, Take 1 tablet by mouth Daily., Disp: , Rfl:     metFORMIN (GLUCOPHAGE) 1000 MG tablet, Take 1 tablet by mouth 2 (Two) Times a Day., Disp: , Rfl: 2    metoprolol tartrate (LOPRESSOR) 50 MG tablet, Take 1 tablet by mouth 2 (Two) Times a Day., Disp: , Rfl:     potassium chloride (K-DUR,KLOR-CON) 20 MEQ CR tablet, Take 1 tablet by mouth Daily., Disp: , Rfl:     rosuvastatin (CRESTOR) 10 MG tablet, TAKE 1 TABLET BY MOUTH DAILY, Disp: 30 tablet, Rfl: 1    traMADol (ULTRAM) 50 MG tablet, Take 1 tablet by mouth 2 (two) times a day., Disp: , Rfl:     vitamin D (ERGOCALCIFEROL) 1.25 MG (27296 UT) capsule capsule, Take 1 capsule by mouth 1 (One) Time Per Week., Disp: , Rfl:     HPI    Ren Mario is a 63 y.o. male who presents today for a follow up of NSVT, NICM, and cardiac risk factors. Since last visit, he has done well from a cardiovascular standpoint.  No symptoms of chest pain unusual shortness of breath, congestive heart failure symptoms, palpitations or syncope.  Has been well-controlled at home in the 110-120 range.  Lipid panel was acceptable.  Send A1c was 7.3.  Unable to  "exercise due to lumbar disc disease.  Uses a cane for ambulation.      The following portions of the patient's history were reviewed and updated as appropriate: allergies, current medications and problem list.    Pertinent positives as listed in the HPI.  All other systems reviewed are negative.         Vitals:    12/21/23 1128   BP: 114/68   BP Location: Left arm   Patient Position: Sitting   SpO2: 97%   Weight: 114 kg (251 lb)   Height: 172.7 cm (68\")       Physical Exam:  General: Alert and oriented.  Neck: Jugular venous pressure is within normal limits. Carotids have normal upstrokes without bruits.   Cardiovascular: Heart has a nondisplaced focal PMI. Regular rate and rhythm. No murmur, gallop or rub.  Lungs: Clear, no rales or wheezes. Equal expansion is noted.   Extremities: Show no edema.  Skin: Warm and dry.  Neurologic: Nonfocal.     Diagnostic Data (reviewed with patient):  Lab date: 02/10/2023  CMP: Glu 247.1, BUN 13.3, Creat 1.2, eGFR 65, Na 138, K 4.5, Cl 97.8, CO2 26.2, Ca 8.8  Lipid panel 7/6/23-total cholesterol 118 HDL cholesterol 29 LDL cholesterol 39 triglycerides 254  Hemoglobin A1c 7.3    Advance Care Planning   ACP discussion was declined by the patient. Patient does not have an advance directive, declines further assistance.         Procedures    Device interrogation today reveals dual-chamber ICD at a rate of 60 bpm,atrium 8% P wave is greater than 5.0, threshold 0.6 V at 0.5 ms, impedance 450 ohms  RV less than 1%, R wave is 11.7, threshold 0.5 V at 0.5 ms, impedance 590 ohms battery voltage is is at 9% -there are 8.4 months of battery life remaining.  Rhythm is normal sinus rhythm at 68 bpm, the patient had 3 events of nonsustained VT 2 lasted 6 seconds and 1 lasted 4 seconds, no therapies were delivered.    Does not have remote monitoring.  Vibration alert was reviewed with the patient and he verbalized understanding.      Assessment:    ICD-10-CM ICD-9-CM   1. NSVT (nonsustained " ventricular tachycardia)  I47.29 427.1   2. Nonischemic cardiomyopathy  I42.8 425.4   3. Essential hypertension  I10 401.9   4. Mixed hyperlipidemia  E78.2 272.2         Plan:  Continue on amlodipine 5 mg daily for hypertension.   Continue on aspirin 81 mg for antiplatelet therapy.   Continue on Lasix 40 mg daily for fluid retention.   Continue on lisinopril 5 mg daily for hypertension.   Continue on metoprolol 50 mg BID for rate control and hypertension.   Continue on rosuvastatin 10 mg daily for hyperlipidemia.   Continue all other current medications.  F/up in 6 months, sooner if needed. [employer] Device Check.  Only 8 months left on Battery.    Scribed for Candida Campbell MD by Ana Grijalva RN. 12/21/2023  11:57 EST    I Candida Campbell MD personally performed the services described in this documentation as scribed by the above individual in my presence, and it is both accurate and complete.    Candida Campbell MD, FACC

## 2023-12-21 ENCOUNTER — OFFICE VISIT (OUTPATIENT)
Dept: CARDIOLOGY | Facility: CLINIC | Age: 63
End: 2023-12-21
Payer: COMMERCIAL

## 2023-12-21 VITALS
WEIGHT: 251 LBS | BODY MASS INDEX: 38.04 KG/M2 | OXYGEN SATURATION: 97 % | DIASTOLIC BLOOD PRESSURE: 68 MMHG | SYSTOLIC BLOOD PRESSURE: 114 MMHG | HEIGHT: 68 IN

## 2023-12-21 DIAGNOSIS — I10 ESSENTIAL HYPERTENSION: ICD-10-CM

## 2023-12-21 DIAGNOSIS — E78.2 MIXED HYPERLIPIDEMIA: ICD-10-CM

## 2023-12-21 DIAGNOSIS — I47.29 NSVT (NONSUSTAINED VENTRICULAR TACHYCARDIA): Primary | ICD-10-CM

## 2023-12-21 DIAGNOSIS — I42.8 NONISCHEMIC CARDIOMYOPATHY: Chronic | ICD-10-CM

## 2023-12-21 RX ORDER — ACETAMINOPHEN 650 MG/1
650 TABLET, FILM COATED, EXTENDED RELEASE ORAL EVERY 8 HOURS PRN
COMMUNITY
Start: 2023-09-25

## 2023-12-21 RX ORDER — METOPROLOL TARTRATE 50 MG/1
50 TABLET, FILM COATED ORAL 2 TIMES DAILY
COMMUNITY
Start: 2023-12-19

## 2023-12-21 NOTE — LETTER
2023       No Recipients    Patient: Ren Mario   YOB: 1960   Date of Visit: 2023     Dear DIEUDONNE Magana:       Thank you for referring Ren Mario to me for evaluation. Below are the relevant portions of my assessment and plan of care.    If you have questions, please do not hesitate to call me. I look forward to following Ren along with you.         Sincerely,        Candida Campbell MD        CC:   No Recipients    Candida Campbell MD  23 1222  Sign when Signing Visit  [facility]    Patient ID: Ren Mario is a 63 y.o. male.  : 1960   Contact: [phone]    Encounter date: 2023    PCP: Heidi Ellington APRN      Chief complaint:   Chief Complaint   Patient presents with   • NSVT (nonsustained ventricular tachycardia)   • Shortness of Breath       Problem List:  Nonischemic cardiomyopathy:  Abnormal echocardiogram, 2014: EF 25-30%. Mild-to-moderate MR, trace TR, and mild diastolic dysfunction.  Abnormal Cardiolite GXT, 2014, [facility]: EF 25%. Evidence of potential ischemia in the anterolateral and inferior myocardial segments.  LHC, 2014, [facility]: Non-critical CAD. EF 25-30%.  Medical management with initiation of ACE inhibitor therapy (patient was already on beta blockade).  Echocardiogram, 2014: EF 20%. Ttrace MR and trace TR.   Dual-chamber ICD placed 2015, by Dr. Leroy Priest using a [employer] Ellipse DR.   Echocardiogram, 2016: EF 45-50%. Trace MR and trace TR.   Echocardiogram, 2019: EF 55%. Borderline LV enlargement.  Echocardiogram 2023: 50%  Ventricular tachycardia/NSVT  One episode of NSVT lasting 27 beats on 2022  One episode of VT lasting 33 beats on 10/12/2022.  Patient asymptomatic and did not receive shock from device.   Hypertension.   Hyperlipidemia.  Obesity with BMI 39.  Type 2 DM.  Hypothyroidism dx  2018  Chronic low back pain.  Former tobacco abuse, cessation 15+ years ago.  Surgical history:  Left arm fracture at age [age].    Allergies   Allergen Reactions   • Lipitor [Atorvastatin] Myalgia     muscle ache and fatigue.   • Naprosyn [Naproxen] Unknown - Low Severity       Current Medications:    Current Outpatient Medications:   •  allopurinol (ZYLOPRIM) 300 MG tablet, Take 1 tablet by mouth Daily., Disp: , Rfl:   •  amLODIPine (NORVASC) 5 MG tablet, Take 1 tablet by mouth Daily., Disp: , Rfl:   •  Arthritis Pain Relief 650 MG 8 hr tablet, Take 1 tablet by mouth Every 8 (Eight) Hours As Needed., Disp: , Rfl:   •  ASPIRIN LOW DOSE 81 MG EC tablet, Take 1 tablet by mouth Daily., Disp: , Rfl: 1  •  colchicine 0.6 MG tablet, Take 1 tablet by mouth As Needed., Disp: , Rfl: 2  •  cyclobenzaprine (FLEXERIL) 10 MG tablet, Take 1 tablet by mouth As Needed., Disp: , Rfl:   •  furosemide (LASIX) 40 MG tablet, Take 1 tablet by mouth Daily., Disp: , Rfl:   •  glipizide (GLUCOTROL) 5 MG tablet, Daily., Disp: , Rfl:   •  JANUVIA 100 MG tablet, Take 1 tablet by mouth Daily., Disp: , Rfl:   •  latanoprost (XALATAN) 0.005 % ophthalmic solution, Daily., Disp: , Rfl:   •  levothyroxine (SYNTHROID, LEVOTHROID) 50 MCG tablet, Take 1 tablet by mouth Daily., Disp: , Rfl:   •  lisinopril (PRINIVIL,ZESTRIL) 5 MG tablet, TAKE ONE TABLET BY MOUTH DAILY, Disp: 90 tablet, Rfl: 2  •  magnesium gluconate (MAGONATE) 500 MG tablet, Take 1 tablet by mouth Daily., Disp: , Rfl:   •  metFORMIN (GLUCOPHAGE) 1000 MG tablet, Take 1 tablet by mouth 2 (Two) Times a Day., Disp: , Rfl: 2  •  metoprolol tartrate (LOPRESSOR) 50 MG tablet, Take 1 tablet by mouth 2 (Two) Times a Day., Disp: , Rfl:   •  potassium chloride (K-DUR,KLOR-CON) 20 MEQ CR tablet, Take 1 tablet by mouth Daily., Disp: , Rfl:   •  rosuvastatin (CRESTOR) 10 MG tablet, TAKE 1 TABLET BY MOUTH DAILY, Disp: 30 tablet, Rfl: 1  •  traMADol (ULTRAM) 50 MG tablet, Take 1 tablet by mouth 2 (two)  "times a day., Disp: , Rfl:   •  vitamin D (ERGOCALCIFEROL) 1.25 MG (39698 UT) capsule capsule, Take 1 capsule by mouth 1 (One) Time Per Week., Disp: , Rfl:     HPI    Ren Mario is a 63 y.o. male who presents today for a follow up of NSVT, NICM, and cardiac risk factors. Since last visit, he has done well from a cardiovascular standpoint.  No symptoms of chest pain unusual shortness of breath, congestive heart failure symptoms, palpitations or syncope.  Has been well-controlled at home in the 110-120 range.  Lipid panel was acceptable.  Send A1c was 7.3.  Unable to exercise due to lumbar disc disease.  Uses a cane for ambulation.      The following portions of the patient's history were reviewed and updated as appropriate: allergies, current medications and problem list.    Pertinent positives as listed in the HPI.  All other systems reviewed are negative.         Vitals:    12/21/23 1128   BP: 114/68   BP Location: Left arm   Patient Position: Sitting   SpO2: 97%   Weight: 114 kg (251 lb)   Height: 172.7 cm (68\")       Physical Exam:  General: Alert and oriented.  Neck: Jugular venous pressure is within normal limits. Carotids have normal upstrokes without bruits.   Cardiovascular: Heart has a nondisplaced focal PMI. Regular rate and rhythm. No murmur, gallop or rub.  Lungs: Clear, no rales or wheezes. Equal expansion is noted.   Extremities: Show no edema.  Skin: Warm and dry.  Neurologic: Nonfocal.     Diagnostic Data (reviewed with patient):  Lab date: 02/10/2023  CMP: Glu 247.1, BUN 13.3, Creat 1.2, eGFR 65, Na 138, K 4.5, Cl 97.8, CO2 26.2, Ca 8.8  Lipid panel 7/6/23-total cholesterol 118 HDL cholesterol 29 LDL cholesterol 39 triglycerides 254  Hemoglobin A1c 7.3    Advance Care Planning  ACP discussion was declined by the patient. Patient does not have an advance directive, declines further assistance.         Procedures    Device interrogation today reveals dual-chamber ICD at a rate of 60 bpm,atrium " 8% P wave is greater than 5.0, threshold 0.6 V at 0.5 ms, impedance 450 ohms  RV less than 1%, R wave is 11.7, threshold 0.5 V at 0.5 ms, impedance 590 ohms battery voltage is is at 9% -there are 8.4 months of battery life remaining.  Rhythm is normal sinus rhythm at 68 bpm, the patient had 3 events of nonsustained VT 2 lasted 6 seconds and 1 lasted 4 seconds, no therapies were delivered.    Does not have remote monitoring.  Vibration alert was reviewed with the patient and he verbalized understanding.      Assessment:    ICD-10-CM ICD-9-CM   1. NSVT (nonsustained ventricular tachycardia)  I47.29 427.1   2. Nonischemic cardiomyopathy  I42.8 425.4   3. Essential hypertension  I10 401.9   4. Mixed hyperlipidemia  E78.2 272.2         Plan:  Continue on amlodipine 5 mg daily for hypertension.   Continue on aspirin 81 mg for antiplatelet therapy.   Continue on Lasix 40 mg daily for fluid retention.   Continue on lisinopril 5 mg daily for hypertension.   Continue on metoprolol 50 mg BID for rate control and hypertension.   Continue on rosuvastatin 10 mg daily for hyperlipidemia.   Continue all other current medications.  F/up in 6 months, sooner if needed. [employer] Device Check.  Only 8 months left on Battery.    Scribed for Candida Campbell MD by Ana Grijalva RN. 12/21/2023  11:57 EST    I Candida Campbell MD personally performed the services described in this documentation as scribed by the above individual in my presence, and it is both accurate and complete.    Candida Campbell MD, [facility]

## 2024-01-19 RX ORDER — ROSUVASTATIN CALCIUM 10 MG/1
10 TABLET, COATED ORAL DAILY
Qty: 90 TABLET | Refills: 3 | Status: SHIPPED | OUTPATIENT
Start: 2024-01-19

## 2024-07-18 ENCOUNTER — OFFICE VISIT (OUTPATIENT)
Dept: CARDIOLOGY | Facility: CLINIC | Age: 64
End: 2024-07-18
Payer: COMMERCIAL

## 2024-07-18 ENCOUNTER — HOSPITAL ENCOUNTER (OUTPATIENT)
Facility: HOSPITAL | Age: 64
Discharge: HOME | End: 2024-07-18
Payer: COMMERCIAL

## 2024-07-18 VITALS
HEIGHT: 69 IN | SYSTOLIC BLOOD PRESSURE: 110 MMHG | DIASTOLIC BLOOD PRESSURE: 58 MMHG | WEIGHT: 238 LBS | HEART RATE: 76 BPM | BODY MASS INDEX: 35.25 KG/M2 | OXYGEN SATURATION: 97 %

## 2024-07-18 DIAGNOSIS — I42.8 NONISCHEMIC CARDIOMYOPATHY: Chronic | ICD-10-CM

## 2024-07-18 DIAGNOSIS — E78.2 MIXED HYPERLIPIDEMIA: ICD-10-CM

## 2024-07-18 DIAGNOSIS — I47.29 NSVT (NONSUSTAINED VENTRICULAR TACHYCARDIA): Primary | ICD-10-CM

## 2024-07-18 DIAGNOSIS — I10 ESSENTIAL HYPERTENSION: ICD-10-CM

## 2024-07-18 PROCEDURE — 93005 ELECTROCARDIOGRAM TRACING: CPT

## 2024-07-18 RX ORDER — MONTELUKAST SODIUM 10 MG/1
10 TABLET ORAL
COMMUNITY
Start: 2024-07-01

## 2024-07-18 RX ORDER — LANOLIN ALCOHOL/MO/W.PET/CERES
400 CREAM (GRAM) TOPICAL
COMMUNITY
Start: 2024-05-02

## 2024-07-18 NOTE — PROGRESS NOTES
[facility]    Patient ID: Ren Mario is a 63 y.o. male.  : 1960   Contact: [phone]    Encounter date: 2024    PCP: Heidi Ellington APRN      Chief complaint:   Chief Complaint   Patient presents with    NSVT (nonsustained ventricular tachycardia)       Problem List:  Nonischemic cardiomyopathy:  Abnormal echocardiogram, 2014: EF 25-30%. Mild-to-moderate MR, trace TR, and mild diastolic dysfunction.  Abnormal Cardiolite GXT, 2014, [facility]: EF 25%. Evidence of potential ischemia in the anterolateral and inferior myocardial segments.  LHC, 2014, [facility]: Non-critical CAD. EF 25-30%.  Medical management with initiation of ACE inhibitor therapy (patient was already on beta blockade).  Echocardiogram, 2014: EF 20%. Ttrace MR and trace TR.   Dual-chamber ICD placed 2015, by Dr. Leroy Priest using a [employer] Ellipse DR.   Echocardiogram, 2016: EF 45-50%. Trace MR and trace TR.   Echocardiogram, 2019: EF 55%. Borderline LV enlargement.  Echocardiogram 2023: 50%  Ventricular tachycardia/NSVT  One episode of NSVT lasting 27 beats on 2022  One episode of VT lasting 33 beats on 10/12/2022.  Patient asymptomatic and did not receive shock from device.   Hypertension.   Hyperlipidemia.  Obesity with BMI 39.  Type 2 DM.  Hypothyroidism dx 2018  Chronic low back pain.  Former tobacco abuse, cessation 15+ years ago.  Surgical history:  Left arm fracture at age [age].    Allergies   Allergen Reactions    Lipitor [Atorvastatin] Myalgia     muscle ache and fatigue.    Naprosyn [Naproxen] Unknown - Low Severity       Current Medications:    Current Outpatient Medications:     allopurinol (ZYLOPRIM) 300 MG tablet, Take 1 tablet by mouth Daily., Disp: , Rfl:     amLODIPine (NORVASC) 5 MG tablet, Take 1 tablet by mouth Daily., Disp: , Rfl:     Arthritis Pain Relief 650 MG 8 hr tablet, Take 1 tablet by mouth Every 8 (Eight)  Hours As Needed., Disp: , Rfl:     ASPIRIN LOW DOSE 81 MG EC tablet, Take 1 tablet by mouth Daily., Disp: , Rfl: 1    colchicine 0.6 MG tablet, Take 1 tablet by mouth As Needed., Disp: , Rfl: 2    cyclobenzaprine (FLEXERIL) 10 MG tablet, Take 1 tablet by mouth As Needed., Disp: , Rfl:     Diclofenac Sodium (VOLTAREN) 1 % gel gel, Apply 4 g topically to the appropriate area as directed., Disp: , Rfl:     furosemide (LASIX) 40 MG tablet, Take 1 tablet by mouth Daily., Disp: , Rfl:     glipizide (GLUCOTROL) 5 MG tablet, Daily., Disp: , Rfl:     JANUVIA 100 MG tablet, Take 1 tablet by mouth Daily., Disp: , Rfl:     latanoprost (XALATAN) 0.005 % ophthalmic solution, Daily., Disp: , Rfl:     levothyroxine (SYNTHROID, LEVOTHROID) 50 MCG tablet, Take 1 tablet by mouth Daily., Disp: , Rfl:     lisinopril (PRINIVIL,ZESTRIL) 5 MG tablet, TAKE ONE TABLET BY MOUTH DAILY, Disp: 90 tablet, Rfl: 2    magnesium gluconate (MAGONATE) 500 MG tablet, Take 1 tablet by mouth Daily., Disp: , Rfl:     Magnesium Oxide -Mg Supplement 400 (240 Mg) MG tablet, 1 tablet., Disp: , Rfl:     metFORMIN (GLUCOPHAGE) 1000 MG tablet, Take 1 tablet by mouth 2 (Two) Times a Day., Disp: , Rfl: 2    metoprolol tartrate (LOPRESSOR) 50 MG tablet, Take 1 tablet by mouth 2 (Two) Times a Day., Disp: , Rfl:     montelukast (SINGULAIR) 10 MG tablet, 1 tablet., Disp: , Rfl:     potassium chloride (K-DUR,KLOR-CON) 20 MEQ CR tablet, Take 1 tablet by mouth Daily., Disp: , Rfl:     rosuvastatin (CRESTOR) 10 MG tablet, TAKE 1 TABLET BY MOUTH DAILY, Disp: 90 tablet, Rfl: 3    traMADol (ULTRAM) 50 MG tablet, Take 1 tablet by mouth 2 (two) times a day., Disp: , Rfl:     vitamin D (ERGOCALCIFEROL) 1.25 MG (01176 UT) capsule capsule, Take 1 capsule by mouth 1 (One) Time Per Week., Disp: , Rfl:     HPI    Ren Mario is a 63 y.o. male who presents today for a follow up of NSVT. NICM, and cardiac risk factors. Since last visit, patient has been doing well overall from a  "cardiovascular standpoint. He reports he has lost 13 pounds since his last visit. Patient stays busy and active by walking but does not have a regular exercise routine. He will be seeing PCP soon and will request routine blood work. Patient denies chest pain, shortness of breath, orthopnea, palpitations, edema, dizziness, and syncope.       The following portions of the patient's history were reviewed and updated as appropriate: allergies, current medications and problem list.    Pertinent positives as listed in the HPI.  All other systems reviewed are negative.         Vitals:    07/18/24 1026   BP: 110/58   BP Location: Left arm   Patient Position: Sitting   Pulse: 76   SpO2: 97%   Weight: 108 kg (238 lb)   Height: 175.3 cm (69\")       Physical Exam:  General: Alert and oriented.  Neck: Jugular venous pressure is within normal limits. Carotids have normal upstrokes without bruits.   Cardiovascular: Heart has a nondisplaced focal PMI. Regular rate and rhythm. No murmur, gallop or rub.  Lungs: Clear, no rales or wheezes. Equal expansion is noted.   Extremities: Show no edema.  Skin: Warm and dry.  Neurologic: Nonfocal.     Diagnostic Data (reviewed with patient):  No recent laboratory studies available for review today.    Advance Care Planning   ACP discussion was held with the patient during this visit. Patient does not have an advance directive, information provided.           ECG 12 Lead    Date/Time: 7/18/2024 10:50 AM  Performed by: Candida Campbell MD    Authorized by: Candida Campbell MD  Comparison: compared with previous ECG from 4/21/2022  Similar to previous ECG  Comparison to previous ECG: Low voltage QRS  Rhythm: sinus rhythm  BPM: 67  Other findings: low voltage  Comments: Borderline ECG      DEVICE INTERROGATION:  /2016, ICD: RA pacing 14%, RV pacing <1%. P wave is >5 mV with a threshold of 0.6 V at 0.5 msec and an impedance of 450 ohms. R wave is 11.7 mV with a threshold of 0.5 V at " 0.5 msec and an impedance of 560 ohms. Battery voltage is <3 months. Events: NSVT x 1. 4 seconds @ 200 bpm.  [place] trending down but pt denies weight gain and dyspnea.      Assessment:    ICD-10-CM ICD-9-CM   1. NSVT (nonsustained ventricular tachycardia)  I47.29 427.1   2. Nonischemic cardiomyopathy  I42.8 425.4   3. Essential hypertension  I10 401.9   4. Mixed hyperlipidemia  E78.2 272.2         Plan:  Begin routine aerobic exercise for at least 30 minutes 5 days per week.  Continue on amlodipine 5 mg daily for hypertension.   Continue on aspirin 81 mg for antiplatelet therapy.   Continue on Lasix 40 mg daily for fluid retention.   Continue on lisinopril 5 mg daily for hypertension.  Continue on metoprolol 50 mg BID for rate control and hypertension.   Continue on rosuvastatin 10 mg daily for hyperlipidemia.   Continue all other current medications.  F/up in 1 month, sooner if needed.      Scribed for Candida Campbell MD by Ladi Vaughn. 7/18/2024 10:49 EDT    I Candida Campbell MD personally performed the services described in this documentation as scribed by the above individual in my presence, and it is both accurate and complete.    Candida Campbell MD, [facility]

## 2024-08-14 NOTE — PROGRESS NOTES
[facility]    Patient ID: Ren Mario is a 63 y.o. male.  : 1960   Contact: [phone]    Encounter date: 08/15/2024    PCP: Heidi Ellington APRN      Chief complaint:   Chief Complaint   Patient presents with    NSVT (nonsustained ventricular tachycardia)       Problem List:  Nonischemic cardiomyopathy:  Abnormal echocardiogram, 2014: EF 25-30%. Mild-to-moderate MR, trace TR, and mild diastolic dysfunction.  Abnormal Cardiolite GXT, 2014, [facility]: EF 25%. Evidence of potential ischemia in the anterolateral and inferior myocardial segments.  LHC, 2014, [facility]: Non-critical CAD. EF 25-30%.  Medical management with initiation of ACE inhibitor therapy (patient was already on beta blockade).  Echocardiogram, 2014: EF 20%. Ttrace MR and trace TR.   Dual-chamber ICD placed 2015, by Dr. Leroy Priest using a [employer] Ellipse DR.   Echocardiogram, 2016: EF 45-50%. Trace MR and trace TR.   Echocardiogram, 2019: EF 55%. Borderline LV enlargement.  Echocardiogram 2023: 50%  Ventricular tachycardia/NSVT  One episode of NSVT lasting 27 beats on 2022  One episode of VT lasting 33 beats on 10/12/2022.  Patient asymptomatic and did not receive shock from device.   Hypertension.   Hyperlipidemia.  Obesity with BMI 39.  Type 2 DM.  Hypothyroidism dx 2018  Chronic low back pain.  Former tobacco abuse, cessation 15+ years ago.  Surgical history:  Left arm fracture at age [age].    Allergies   Allergen Reactions    Lipitor [Atorvastatin] Myalgia     muscle ache and fatigue.    Naprosyn [Naproxen] Unknown - Low Severity       Current Medications:    Current Outpatient Medications:     allopurinol (ZYLOPRIM) 300 MG tablet, Take 1 tablet by mouth Daily., Disp: , Rfl:     amLODIPine (NORVASC) 5 MG tablet, Take 1 tablet by mouth Daily., Disp: , Rfl:     Arthritis Pain Relief 650 MG 8 hr tablet, Take 1 tablet by mouth Every 8 (Eight)  Hours As Needed., Disp: , Rfl:     ASPIRIN LOW DOSE 81 MG EC tablet, Take 1 tablet by mouth Daily., Disp: , Rfl: 1    colchicine 0.6 MG tablet, Take 1 tablet by mouth As Needed., Disp: , Rfl: 2    cyclobenzaprine (FLEXERIL) 10 MG tablet, Take 1 tablet by mouth As Needed., Disp: , Rfl:     Diclofenac Sodium (VOLTAREN) 1 % gel gel, Apply 4 g topically to the appropriate area as directed., Disp: , Rfl:     furosemide (LASIX) 40 MG tablet, Take 1 tablet by mouth Daily., Disp: , Rfl:     glipizide (GLUCOTROL) 5 MG tablet, Take 1 tablet by mouth Daily., Disp: , Rfl:     JANUVIA 100 MG tablet, Take 1 tablet by mouth Daily., Disp: , Rfl:     latanoprost (XALATAN) 0.005 % ophthalmic solution, Administer 1 drop to both eyes Daily., Disp: , Rfl:     levothyroxine (SYNTHROID, LEVOTHROID) 50 MCG tablet, Take 1 tablet by mouth Daily., Disp: , Rfl:     lisinopril (PRINIVIL,ZESTRIL) 5 MG tablet, TAKE ONE TABLET BY MOUTH DAILY, Disp: 90 tablet, Rfl: 2    Magnesium Oxide -Mg Supplement 400 (240 Mg) MG tablet, Take 1 tablet by mouth Daily., Disp: , Rfl:     metFORMIN (GLUCOPHAGE) 1000 MG tablet, Take 1 tablet by mouth 2 (Two) Times a Day., Disp: , Rfl: 2    metoprolol tartrate (LOPRESSOR) 50 MG tablet, Take 1 tablet by mouth 2 (Two) Times a Day., Disp: , Rfl:     montelukast (SINGULAIR) 10 MG tablet, Take 1 tablet by mouth Every Night., Disp: , Rfl:     potassium chloride (K-DUR,KLOR-CON) 20 MEQ CR tablet, Take 1 tablet by mouth Daily., Disp: , Rfl:     rosuvastatin (CRESTOR) 10 MG tablet, TAKE 1 TABLET BY MOUTH DAILY, Disp: 90 tablet, Rfl: 3    traMADol (ULTRAM) 50 MG tablet, Take 1 tablet by mouth As Needed., Disp: , Rfl:     vitamin D (ERGOCALCIFEROL) 1.25 MG (92449 UT) capsule capsule, Take 1 capsule by mouth 1 (One) Time Per Week., Disp: , Rfl:     HPI    Ren Mario is a 63 y.o. male who presents today for a 1 month follow up of NSVT, NICM, and cardiac risk factors. Since last visit, patient's ICD has hit HOANG.  Patient  "states that he felt the vibration is soon as he left the office a month ago.  Patient overall has been feeling well without any lower extremity edema, orthopnea or increased shortness of breath to indicate heart failure symptoms.  Patient has had no palpitations and no feeling of discharge of his ICD.  I did discuss with the patient that he would have to come to [place] to have the ICD generator change and he is agreeable to this.      The following portions of the patient's history were reviewed and updated as appropriate: allergies, current medications and problem list.    Pertinent positives as listed in the HPI.  All other systems reviewed are negative.         Vitals:    08/15/24 0950   BP: 112/50   BP Location: Left arm   Patient Position: Sitting   Pulse: 68   SpO2: 97%   Weight: 108 kg (238 lb)   Height: 175.3 cm (69\")       Physical Exam:  General: Alert and oriented.  Neck: Jugular venous pressure is within normal limits. Carotids have normal upstrokes without bruits.   Cardiovascular:  Regular rate and rhythm. No murmur, gallop or rub.  Lungs: Clear, no rales or wheezes. Equal expansion is noted.   Extremities: No peripheral edema  Skin: Warm and dry.  Neurologic: Nonfocal.     Diagnostic Data (reviewed with patient):  3/5/2024  TSH 5.09, free T4 1.0  Magnesium 2.1  , trig 408, HDL 24, LDL 10  Creatinine 1.2, BUN 18  ALT 34, AST 48, glucose 141, potassium 4.2    Advance Care Planning   ACP discussion was declined by the patient. Patient does not have an advance directive, declines further assistance.             Assessment:    ICD-10-CM ICD-9-CM   1. NSVT (nonsustained ventricular tachycardia)  I47.29 427.1   2. Nonischemic cardiomyopathy  I42.8 425.4   3. Essential hypertension  I10 401.9   4. Mixed hyperlipidemia  E78.2 272.2   5. ICD (implantable cardioverter-defibrillator), dual, in situ  Z95.810 V45.02   6. Implantable cardioverter-defibrillator (ICD) generator end of life  Z45.02 V53.32 "         Plan:  Patient's ICD is at end-of-life.  Patient did not undergo device interrogation today given that the rep was not here at the time of the patient's appointment patient did not wish to wait which we agreed was reasonable.  Patient will receive referred to our electrophysiology team for generator change.  I discussed with him that he will be contacted about setting this up in [place].  Continue on aspirin 81 mg for antiplatelet therapy.   Continue on Lasix 40 mg daily for fluid retention.   Continue on amlodipine 10 mg and lisinopril 5 mg daily for hypertension.   Continue on metoprolol 50 mg BID for rate control and hypertension.   Continue on rosuvastatin 10 mg daily for hyperlipidemia.   Continue all other current medications.  F/up in 6 months, sooner if needed.    Natalie Archuleta PA-C

## 2024-08-15 ENCOUNTER — OFFICE VISIT (OUTPATIENT)
Dept: CARDIOLOGY | Facility: CLINIC | Age: 64
End: 2024-08-15
Payer: COMMERCIAL

## 2024-08-15 VITALS
OXYGEN SATURATION: 97 % | BODY MASS INDEX: 35.25 KG/M2 | HEART RATE: 68 BPM | DIASTOLIC BLOOD PRESSURE: 50 MMHG | SYSTOLIC BLOOD PRESSURE: 112 MMHG | HEIGHT: 69 IN | WEIGHT: 238 LBS

## 2024-08-15 DIAGNOSIS — Z45.02 IMPLANTABLE CARDIOVERTER-DEFIBRILLATOR (ICD) GENERATOR END OF LIFE: Primary | ICD-10-CM

## 2024-08-15 DIAGNOSIS — I47.29 NSVT (NONSUSTAINED VENTRICULAR TACHYCARDIA): ICD-10-CM

## 2024-08-15 DIAGNOSIS — I42.8 NONISCHEMIC CARDIOMYOPATHY: Chronic | ICD-10-CM

## 2024-08-15 DIAGNOSIS — I10 ESSENTIAL HYPERTENSION: ICD-10-CM

## 2024-08-15 DIAGNOSIS — E78.2 MIXED HYPERLIPIDEMIA: ICD-10-CM

## 2024-08-15 DIAGNOSIS — Z95.810 ICD (IMPLANTABLE CARDIOVERTER-DEFIBRILLATOR), DUAL, IN SITU: ICD-10-CM

## 2024-08-27 RX ORDER — LISINOPRIL 5 MG/1
5 TABLET ORAL DAILY
Qty: 90 TABLET | Refills: 3 | Status: SHIPPED | OUTPATIENT
Start: 2024-08-27

## 2024-10-21 NOTE — PROGRESS NOTES
Electrophysiology Clinic Consult     Ren Mario  [id number]  1960    Referring Provider: Natalie Archuleta PA-C   PCP: Heidi Ellington, APRN  [address] / [place] [address]    Date of Service: 10/24/24    Chief Complaint   Patient presents with    Implantable cardioverter-defibrillator (ICD) generator end      Problem List  Nonischemic cardiomyopathy:  Abnormal echocardiogram, July 2014: EF 25-30%. Mild-to-moderate MR, trace TR, and mild diastolic dysfunction.  Abnormal Cardiolite GXT, 08/07/2014, [facility]: EF 25%. Evidence of potential ischemia in the anterolateral and inferior myocardial segments.  LHC, 09/02/2014, [facility]: Non-critical CAD. EF 25-30%.  Medical management with initiation of ACE inhibitor therapy (patient was already on beta blockade).  Echocardiogram, 12/08/2014: EF 20%. Ttrace MR and trace TR.   Dual-chamber ICD placed 04/21/2015, by Dr. Leroy Priest using a [employer] Ellipse DR.   Echocardiogram, 11/07/2016: EF 45-50%. Trace MR and trace TR.   Echocardiogram, 01/08/2019: EF 55%. Borderline LV enlargement.  Echocardiogram 6/8/2023: 50%  Ventricular tachycardia/NSVT  One episode of NSVT lasting 27 beats on 9/11/2022  One episode of VT lasting 33 beats on 10/12/2022.  Patient asymptomatic and did not receive shock from device.   Hypertension.   Hyperlipidemia.  Obesity with BMI 39.  Type 2 DM.  Hypothyroidism dx 2018  Chronic low back pain.  Former tobacco abuse, cessation 15+ years ago.  Surgical history:  Left arm fracture    History of Present Illness  Ren Mario is a 64 y.o. male who presents to my electrophysiology clinic for evaluation of NICM/NSVT. Device now at [facility]ANG and patient referred to my office for further evaluation of his device. Otherwise patient doing well.      Review of Systems   Constitutional:  Negative for activity change, fatigue and fever.   Respiratory:  Negative for chest tightness and shortness of breath.    Cardiovascular:  Negative for  chest pain, palpitations and leg swelling.   Gastrointestinal:  Negative for constipation and diarrhea.   Genitourinary:  Negative for decreased urine volume and difficulty urinating.   Skin:  Negative for wound.   Neurological:  Negative for dizziness, syncope, weakness and light-headedness.   Psychiatric/Behavioral:  Negative for suicidal ideas.        Outpatient Medications Marked as Taking for the 10/24/24 encounter (Office Visit) with Mauro Granados MD   Medication Sig Dispense Refill    allopurinol (ZYLOPRIM) 300 MG tablet Take 1 tablet by mouth Daily.      amLODIPine (NORVASC) 5 MG tablet Take 1 tablet by mouth Daily.      Arthritis Pain Relief 650 MG 8 hr tablet Take 1 tablet by mouth Every 8 (Eight) Hours As Needed.      ASPIRIN LOW DOSE 81 MG EC tablet Take 1 tablet by mouth Daily.  1    cetirizine (zyrTEC) 10 MG tablet       colchicine 0.6 MG tablet Take 1 tablet by mouth As Needed.  2    cyclobenzaprine (FLEXERIL) 10 MG tablet Take 1 tablet by mouth As Needed.      Diclofenac Sodium (VOLTAREN) 1 % gel gel Apply 4 g topically to the appropriate area as directed.      FREESTYLE LITE test strip       furosemide (LASIX) 40 MG tablet Take 1 tablet by mouth Daily.      glipizide (GLUCOTROL) 5 MG tablet Take 1 tablet by mouth Daily.      JANUVIA 100 MG tablet Take 1 tablet by mouth Daily.      latanoprost (XALATAN) 0.005 % ophthalmic solution Administer 1 drop to both eyes Daily.      levothyroxine (SYNTHROID, LEVOTHROID) 50 MCG tablet Take 1 tablet by mouth Daily.      lisinopril (PRINIVIL,ZESTRIL) 5 MG tablet TAKE 1 TABLET BY MOUTH EVERY DAY 90 tablet 3    Magnesium Oxide -Mg Supplement 400 (240 Mg) MG tablet Take 1 tablet by mouth Daily.      metFORMIN (GLUCOPHAGE) 1000 MG tablet Take 1 tablet by mouth 2 (Two) Times a Day.  2    metoprolol tartrate (LOPRESSOR) 50 MG tablet Take 1 tablet by mouth 2 (Two) Times a Day.      montelukast (SINGULAIR) 10 MG tablet Take 1 tablet by mouth Every Night.      potassium  "chloride (K-DUR,KLOR-CON) 20 MEQ CR tablet Take 1 tablet by mouth Daily.      potassium chloride 10 MEQ CR tablet       rosuvastatin (CRESTOR) 10 MG tablet TAKE 1 TABLET BY MOUTH DAILY 90 tablet 3    traMADol (ULTRAM) 50 MG tablet Take 1 tablet by mouth As Needed.      vitamin D (ERGOCALCIFEROL) 1.25 MG (31272 UT) capsule capsule Take 1 capsule by mouth 1 (One) Time Per Week.         Physical Exam  Vitals:    10/24/24 0936   BP: 112/64   Pulse: 73   SpO2: 98%   Weight: 104 kg (229 lb 12.8 oz)   Height: 175.3 cm (69\")     Body mass index is 33.94 kg/m².    Physical Exam     Diagnostic Data    ECG 12 Lead    Date/Time: 10/24/2024 5:32 PM  Performed by: Mauro Granados MD    Authorized by: Mauro Granados MD  Comparison: not compared with previous ECG   Rhythm: paced  Rate: normal  Conduction: conduction normal  QRS axis: normal  Other: no other findings    Clinical impression: normal ECG        Device interrogation October 2024  [employer] dual-chamber ICD  Atrial pacing 29% P wave greater than 5 pacing threshold 0.5 at 0.5 impedance 480  RV pacing less than 1% R wave greater than 11.7 pacing threshold 0.5 at 0.5 impedance 610 high-voltage 90  Device at [facility]ANG    Lab Results   Component Value Date    GLUCOSE 150 (H) 04/20/2015    CALCIUM 8.9 04/20/2015     04/20/2015    K 4.2 04/20/2015    CO2 25 04/20/2015     04/20/2015    BUN 18 04/20/2015    CREATININE 1.1 04/20/2015    ANIONGAP 11 04/20/2015     Lab Results   Component Value Date    WBC 9.02 04/20/2015    HGB 13.0 (L) 04/20/2015    HCT 38.3 (L) 04/20/2015    MCV 88.0 04/20/2015     04/20/2015     Lab Results   Component Value Date    INR 0.96 04/20/2015    PROTIME 10.0 04/20/2015     No results found for: \"TSH\", \"T6SAAJE\", \"Y8PMPAM\", \"THYROIDAB\"      I personally viewed and interpreted the patient's EKG/Telemetry/lab data    Ren Mario  reports that he quit smoking about 17 years ago. His smoking use included cigarettes. He has never used " smokeless tobacco. I have educated him on the risk of diseases from using tobacco products such as cancer, COPD, and heart disease.     I spent 3  minutes counseling the patient.       ACP discussion was declined by the patient. Patient does not have an advance directive, declines further assistance.    Assessment and Plan   Diagnoses and all orders for this visit:    1. Nonischemic cardiomyopathy (Primary)    2. NSVT (nonsustained ventricular tachycardia)    3. Essential hypertension    Other orders  -     ECG 12 Lead        NICM  -Echo, 6/8/2023: EF 50%  -SJM DC ICD, 04/21/2015, by Dr. Leroy Priest   -Device hit HOANG in July  -After extensive conversation regarding risks, benefits, or alternatives to the therapy, patient elected to proceed with the ICD genenartor changeout procedure      NSVT/VT  -One episode of NSVT lasting 27 beats on 9/11/2022  -One episode of VT lasting 33 beats on 10/12/2022.  Patient asymptomatic and did not receive shock from device    Hypertension  -Well controlled, continue current medication regimen  -Follows with Dr. Campbell    Follow Up  Return for Follow up after Procedure, [facility].      Thank you for allowing me to participate in the care of your patient. Please to not hesitate to contact me with additional questions or concerns.        Mauro Granados MD [facility]  Cardiac Electrophysiologist  [facility] / [facility]

## 2024-10-21 NOTE — H&P (VIEW-ONLY)
Electrophysiology Clinic Consult     Ren Mario  [id number]  1960    Referring Provider: Natalie Archuleta PA-C   PCP: Heidi Ellington, APRN  [address] / [place] [address]    Date of Service: 10/24/24    Chief Complaint   Patient presents with    Implantable cardioverter-defibrillator (ICD) generator end      Problem List  Nonischemic cardiomyopathy:  Abnormal echocardiogram, July 2014: EF 25-30%. Mild-to-moderate MR, trace TR, and mild diastolic dysfunction.  Abnormal Cardiolite GXT, 08/07/2014, [facility]: EF 25%. Evidence of potential ischemia in the anterolateral and inferior myocardial segments.  LHC, 09/02/2014, [facility]: Non-critical CAD. EF 25-30%.  Medical management with initiation of ACE inhibitor therapy (patient was already on beta blockade).  Echocardiogram, 12/08/2014: EF 20%. Ttrace MR and trace TR.   Dual-chamber ICD placed 04/21/2015, by Dr. Leroy Priest using a [employer] Ellipse DR.   Echocardiogram, 11/07/2016: EF 45-50%. Trace MR and trace TR.   Echocardiogram, 01/08/2019: EF 55%. Borderline LV enlargement.  Echocardiogram 6/8/2023: 50%  Ventricular tachycardia/NSVT  One episode of NSVT lasting 27 beats on 9/11/2022  One episode of VT lasting 33 beats on 10/12/2022.  Patient asymptomatic and did not receive shock from device.   Hypertension.   Hyperlipidemia.  Obesity with BMI 39.  Type 2 DM.  Hypothyroidism dx 2018  Chronic low back pain.  Former tobacco abuse, cessation 15+ years ago.  Surgical history:  Left arm fracture    History of Present Illness  Ren Mario is a 64 y.o. male who presents to my electrophysiology clinic for evaluation of NICM/NSVT. Device now at [facility] and patient referred to my office for further evaluation of his device. Otherwise patient doing well.      Review of Systems   Constitutional:  Negative for activity change, fatigue and fever.   Respiratory:  Negative for chest tightness and shortness of breath.    Cardiovascular:  Negative for  chest pain, palpitations and leg swelling.   Gastrointestinal:  Negative for constipation and diarrhea.   Genitourinary:  Negative for decreased urine volume and difficulty urinating.   Skin:  Negative for wound.   Neurological:  Negative for dizziness, syncope, weakness and light-headedness.   Psychiatric/Behavioral:  Negative for suicidal ideas.        Outpatient Medications Marked as Taking for the 10/24/24 encounter (Office Visit) with Mauro Granados MD   Medication Sig Dispense Refill    allopurinol (ZYLOPRIM) 300 MG tablet Take 1 tablet by mouth Daily.      amLODIPine (NORVASC) 5 MG tablet Take 1 tablet by mouth Daily.      Arthritis Pain Relief 650 MG 8 hr tablet Take 1 tablet by mouth Every 8 (Eight) Hours As Needed.      ASPIRIN LOW DOSE 81 MG EC tablet Take 1 tablet by mouth Daily.  1    cetirizine (zyrTEC) 10 MG tablet       colchicine 0.6 MG tablet Take 1 tablet by mouth As Needed.  2    cyclobenzaprine (FLEXERIL) 10 MG tablet Take 1 tablet by mouth As Needed.      Diclofenac Sodium (VOLTAREN) 1 % gel gel Apply 4 g topically to the appropriate area as directed.      FREESTYLE LITE test strip       furosemide (LASIX) 40 MG tablet Take 1 tablet by mouth Daily.      glipizide (GLUCOTROL) 5 MG tablet Take 1 tablet by mouth Daily.      JANUVIA 100 MG tablet Take 1 tablet by mouth Daily.      latanoprost (XALATAN) 0.005 % ophthalmic solution Administer 1 drop to both eyes Daily.      levothyroxine (SYNTHROID, LEVOTHROID) 50 MCG tablet Take 1 tablet by mouth Daily.      lisinopril (PRINIVIL,ZESTRIL) 5 MG tablet TAKE 1 TABLET BY MOUTH EVERY DAY 90 tablet 3    Magnesium Oxide -Mg Supplement 400 (240 Mg) MG tablet Take 1 tablet by mouth Daily.      metFORMIN (GLUCOPHAGE) 1000 MG tablet Take 1 tablet by mouth 2 (Two) Times a Day.  2    metoprolol tartrate (LOPRESSOR) 50 MG tablet Take 1 tablet by mouth 2 (Two) Times a Day.      montelukast (SINGULAIR) 10 MG tablet Take 1 tablet by mouth Every Night.      potassium  "chloride (K-DUR,KLOR-CON) 20 MEQ CR tablet Take 1 tablet by mouth Daily.      potassium chloride 10 MEQ CR tablet       rosuvastatin (CRESTOR) 10 MG tablet TAKE 1 TABLET BY MOUTH DAILY 90 tablet 3    traMADol (ULTRAM) 50 MG tablet Take 1 tablet by mouth As Needed.      vitamin D (ERGOCALCIFEROL) 1.25 MG (23145 UT) capsule capsule Take 1 capsule by mouth 1 (One) Time Per Week.         Physical Exam  Vitals:    10/24/24 0936   BP: 112/64   Pulse: 73   SpO2: 98%   Weight: 104 kg (229 lb 12.8 oz)   Height: 175.3 cm (69\")     Body mass index is 33.94 kg/m².    Physical Exam     Diagnostic Data    ECG 12 Lead    Date/Time: 10/24/2024 5:32 PM  Performed by: Mauro Granados MD    Authorized by: Mauro Granados MD  Comparison: not compared with previous ECG   Rhythm: paced  Rate: normal  Conduction: conduction normal  QRS axis: normal  Other: no other findings    Clinical impression: normal ECG        Device interrogation October 2024  [employer] dual-chamber ICD  Atrial pacing 29% P wave greater than 5 pacing threshold 0.5 at 0.5 impedance 480  RV pacing less than 1% R wave greater than 11.7 pacing threshold 0.5 at 0.5 impedance 610 high-voltage 90  Device at [facility]    Lab Results   Component Value Date    GLUCOSE 150 (H) 04/20/2015    CALCIUM 8.9 04/20/2015     04/20/2015    K 4.2 04/20/2015    CO2 25 04/20/2015     04/20/2015    BUN 18 04/20/2015    CREATININE 1.1 04/20/2015    ANIONGAP 11 04/20/2015     Lab Results   Component Value Date    WBC 9.02 04/20/2015    HGB 13.0 (L) 04/20/2015    HCT 38.3 (L) 04/20/2015    MCV 88.0 04/20/2015     04/20/2015     Lab Results   Component Value Date    INR 0.96 04/20/2015    PROTIME 10.0 04/20/2015     No results found for: \"TSH\", \"N1PUQKT\", \"J6ZHPEQ\", \"THYROIDAB\"      I personally viewed and interpreted the patient's EKG/Telemetry/lab data    Ren Mario  reports that he quit smoking about 17 years ago. His smoking use included cigarettes. He has never used " smokeless tobacco. I have educated him on the risk of diseases from using tobacco products such as cancer, COPD, and heart disease.     I spent 3  minutes counseling the patient.       ACP discussion was declined by the patient. Patient does not have an advance directive, declines further assistance.    Assessment and Plan   Diagnoses and all orders for this visit:    1. Nonischemic cardiomyopathy (Primary)    2. NSVT (nonsustained ventricular tachycardia)    3. Essential hypertension    Other orders  -     ECG 12 Lead        NICM  -Echo, 6/8/2023: EF 50%  -SJM DC ICD, 04/21/2015, by Dr. Leroy Priest   -Device hit HOANG in July  -After extensive conversation regarding risks, benefits, or alternatives to the therapy, patient elected to proceed with the ICD genenartor changeout procedure      NSVT/VT  -One episode of NSVT lasting 27 beats on 9/11/2022  -One episode of VT lasting 33 beats on 10/12/2022.  Patient asymptomatic and did not receive shock from device    Hypertension  -Well controlled, continue current medication regimen  -Follows with Dr. Campbell    Follow Up  Return for Follow up after Procedure, [facility].      Thank you for allowing me to participate in the care of your patient. Please to not hesitate to contact me with additional questions or concerns.        Mauro Granados MD [facility]  Cardiac Electrophysiologist  [facility] / [facility]

## 2024-10-24 ENCOUNTER — OFFICE VISIT (OUTPATIENT)
Dept: CARDIOLOGY | Facility: CLINIC | Age: 64
End: 2024-10-24
Payer: COMMERCIAL

## 2024-10-24 VITALS
OXYGEN SATURATION: 98 % | DIASTOLIC BLOOD PRESSURE: 64 MMHG | SYSTOLIC BLOOD PRESSURE: 112 MMHG | BODY MASS INDEX: 34.04 KG/M2 | HEIGHT: 69 IN | WEIGHT: 229.8 LBS | HEART RATE: 73 BPM

## 2024-10-24 DIAGNOSIS — Z45.02 IMPLANTABLE CARDIOVERTER-DEFIBRILLATOR (ICD) AT END OF BATTERY LIFE: Primary | ICD-10-CM

## 2024-10-24 DIAGNOSIS — I47.29 NSVT (NONSUSTAINED VENTRICULAR TACHYCARDIA): Chronic | ICD-10-CM

## 2024-10-24 DIAGNOSIS — I42.8 NONISCHEMIC CARDIOMYOPATHY: Primary | Chronic | ICD-10-CM

## 2024-10-24 DIAGNOSIS — I10 ESSENTIAL HYPERTENSION: Chronic | ICD-10-CM

## 2024-10-24 RX ORDER — POTASSIUM CHLORIDE 750 MG/1
TABLET, EXTENDED RELEASE ORAL
COMMUNITY
Start: 2024-09-25

## 2024-10-24 RX ORDER — CETIRIZINE HYDROCHLORIDE 10 MG/1
TABLET ORAL
COMMUNITY
Start: 2024-09-25

## 2024-10-24 RX ORDER — BLOOD-GLUCOSE METER
KIT MISCELLANEOUS
COMMUNITY
Start: 2024-08-27

## 2024-10-30 ENCOUNTER — HOSPITAL ENCOUNTER (OUTPATIENT)
Facility: HOSPITAL | Age: 64
Setting detail: HOSPITAL OUTPATIENT SURGERY
Discharge: HOME | End: 2024-10-30
Attending: INTERNAL MEDICINE | Admitting: INTERNAL MEDICINE
Payer: COMMERCIAL

## 2024-10-30 VITALS
DIASTOLIC BLOOD PRESSURE: 59 MMHG | TEMPERATURE: 96.6 F | OXYGEN SATURATION: 93 % | SYSTOLIC BLOOD PRESSURE: 94 MMHG | RESPIRATION RATE: 18 BRPM | HEART RATE: 72 BPM

## 2024-10-30 DIAGNOSIS — Z45.02 IMPLANTABLE CARDIOVERTER-DEFIBRILLATOR (ICD) AT END OF BATTERY LIFE: ICD-10-CM

## 2024-10-30 LAB
ANION GAP SERPL CALCULATED.3IONS-SCNC: 16 MMOL/L (ref 5–15)
BUN SERPL-MCNC: 14 MG/DL (ref 8–23)
BUN/CREAT SERPL: 8.6 (ref 7–25)
CALCIUM SPEC-SCNC: 9.3 MG/DL (ref 8.6–10.5)
CHLORIDE SERPL-SCNC: 99 MMOL/L (ref 98–107)
CO2 SERPL-SCNC: 24 MMOL/L (ref 22–29)
CREAT SERPL-MCNC: 1.63 MG/DL (ref 0.76–1.27)
DEPRECATED RDW RBC AUTO: 47.8 FL (ref 37–54)
EGFRCR SERPLBLD CKD-EPI 2021: 46.5 ML/MIN/1.73
ERYTHROCYTE [DISTWIDTH] IN BLOOD BY AUTOMATED COUNT: 14.6 % (ref 12.3–15.4)
GLUCOSE BLDC GLUCOMTR-MCNC: 139 MG/DL (ref 70–130)
GLUCOSE SERPL-MCNC: 128 MG/DL (ref 65–99)
HCT VFR BLD AUTO: 43.4 % (ref 37.5–51)
HGB BLD-MCNC: 13.9 G/DL (ref 13–17.7)
MCH RBC QN AUTO: 28.8 PG (ref 26.6–33)
MCHC RBC AUTO-ENTMCNC: 32 G/DL (ref 31.5–35.7)
MCV RBC AUTO: 89.9 FL (ref 79–97)
PLATELET # BLD AUTO: 272 10*3/MM3 (ref 140–450)
PMV BLD AUTO: 10.2 FL (ref 6–12)
POTASSIUM SERPL-SCNC: 5.1 MMOL/L (ref 3.5–5.2)
RBC # BLD AUTO: 4.83 10*6/MM3 (ref 4.14–5.8)
SODIUM SERPL-SCNC: 139 MMOL/L (ref 136–145)
WBC NRBC COR # BLD AUTO: 12.23 10*3/MM3 (ref 3.4–10.8)

## 2024-10-30 PROCEDURE — 25010000002 ONDANSETRON PER 1 MG: Performed by: INTERNAL MEDICINE

## 2024-10-30 PROCEDURE — 80048 BASIC METABOLIC PNL TOTAL CA: CPT | Performed by: INTERNAL MEDICINE

## 2024-10-30 PROCEDURE — C1721 AICD, DUAL CHAMBER: HCPCS | Performed by: INTERNAL MEDICINE

## 2024-10-30 PROCEDURE — 25010000002 FENTANYL CITRATE (PF) 50 MCG/ML SOLUTION: Performed by: INTERNAL MEDICINE

## 2024-10-30 PROCEDURE — 82948 REAGENT STRIP/BLOOD GLUCOSE: CPT

## 2024-10-30 PROCEDURE — 36415 COLL VENOUS BLD VENIPUNCTURE: CPT

## 2024-10-30 PROCEDURE — 25010000002 MIDAZOLAM PER 1 MG: Performed by: INTERNAL MEDICINE

## 2024-10-30 PROCEDURE — 99153 MOD SED SAME PHYS/QHP EA: CPT | Performed by: INTERNAL MEDICINE

## 2024-10-30 PROCEDURE — 33263 RMVL & RPLCMT DFB GEN 2 LEAD: CPT | Performed by: INTERNAL MEDICINE

## 2024-10-30 PROCEDURE — 99152 MOD SED SAME PHYS/QHP 5/>YRS: CPT | Performed by: INTERNAL MEDICINE

## 2024-10-30 PROCEDURE — 85027 COMPLETE CBC AUTOMATED: CPT | Performed by: INTERNAL MEDICINE

## 2024-10-30 PROCEDURE — 25010000002 LIDOCAINE 1% - EPINEPHRINE 1:100000 1 %-1:100000 SOLUTION: Performed by: INTERNAL MEDICINE

## 2024-10-30 PROCEDURE — 25010000002 CEFAZOLIN PER 500 MG: Performed by: INTERNAL MEDICINE

## 2024-10-30 PROCEDURE — 94660 CPAP INITIATION&MGMT: CPT

## 2024-10-30 DEVICE — ICD GALLANT DR DF4/IS1 69X51X12MM: Type: IMPLANTABLE DEVICE | Site: CHEST WALL | Status: FUNCTIONAL

## 2024-10-30 RX ORDER — ETOMIDATE 2 MG/ML
INJECTION INTRAVENOUS
Status: DISCONTINUED | OUTPATIENT
Start: 2024-10-30 | End: 2024-10-30 | Stop reason: HOSPADM

## 2024-10-30 RX ORDER — ONDANSETRON 2 MG/ML
INJECTION INTRAMUSCULAR; INTRAVENOUS
Status: DISCONTINUED | OUTPATIENT
Start: 2024-10-30 | End: 2024-10-30 | Stop reason: HOSPADM

## 2024-10-30 RX ORDER — LIDOCAINE HYDROCHLORIDE AND EPINEPHRINE 10; 10 MG/ML; UG/ML
INJECTION, SOLUTION INFILTRATION; PERINEURAL
Status: DISCONTINUED | OUTPATIENT
Start: 2024-10-30 | End: 2024-10-30 | Stop reason: HOSPADM

## 2024-10-30 RX ORDER — FENTANYL CITRATE 50 UG/ML
INJECTION, SOLUTION INTRAMUSCULAR; INTRAVENOUS
Status: DISCONTINUED | OUTPATIENT
Start: 2024-10-30 | End: 2024-10-30 | Stop reason: HOSPADM

## 2024-10-30 RX ORDER — MIDAZOLAM HYDROCHLORIDE 1 MG/ML
INJECTION, SOLUTION INTRAMUSCULAR; INTRAVENOUS
Status: DISCONTINUED | OUTPATIENT
Start: 2024-10-30 | End: 2024-10-30 | Stop reason: HOSPADM

## 2024-10-30 RX ADMIN — SODIUM CHLORIDE 2000 MG: 900 INJECTION INTRAVENOUS at 13:42

## 2024-10-30 NOTE — DISCHARGE INSTRUCTIONS
DR. GABRIEL DEVICE GENERATOR CHANGE  Pressure Dressing from device site will be removed the morning after procedure or prior to   discharge if the patient goes home the same day. Patient will have an Aquacel dressing   underneath. Typically, no steri-strips or glue is used. The Aquacel Dressing will be removed at   the wound check appointment in 7-10 days.   Please do not lift more than 10 pounds or raise the affected arm above the shoulder for 2 weeks   after the device was implanted (this does not apply to subcutaneous ICDs).  Avoid activities that involve heavy lifting or rough contact that could result in blows to your   implant site. This allows the incision time to heal.  You may shower the day after your procedure.   Do not apply creams, lotions or powders to the incision.   Please avoid allowing a bra strap or suspenders to lay over the incision until it is completely   healed.   No baths, hot tubs or swimming for 4 weeks.   Call your doctor if you have any swelling, redness or discharge around your incision, notice   anything unusual or unexpected or you develop a fever that does not go away in two to three   days.   Call your doctor if you hear any beeping sounds/vibratory alerts from your device as this   indicates your device needs to be checked immediately.  You will be scheduled for a 7-10 day wound check appointment and a 3 month follow up to   check your device.   Carry your medical device ID card with you at all times.   Please call our office at [phone] with any questions about the device or incision

## 2024-10-30 NOTE — INTERVAL H&P NOTE
H&P reviewed. The patient was examined and there are no changes to the H&P.      There were no vitals filed for this visit.    Lab Results   Component Value Date    WBC 12.23 (H) 10/30/2024    HGB 13.9 10/30/2024    HCT 43.4 10/30/2024    MCV 89.9 10/30/2024     10/30/2024     Lab Results   Component Value Date    GLUCOSE 128 (H) 10/30/2024    CALCIUM 9.3 10/30/2024     10/30/2024    K 5.1 10/30/2024    CO2 24.0 10/30/2024    CL 99 10/30/2024    BUN 14 10/30/2024    CREATININE 1.63 (H) 10/30/2024    EGFR 46.5 (L) 10/30/2024    BCR 8.6 10/30/2024    ANIONGAP 16.0 (H) 10/30/2024     Patient here today for dual-chamber permanent pacemaker generator change.  Device hit HOANG in July.  Procedure, risks, and alternatives have been discussed with the patient and he is agreeable to proceed.  Further recommendations to follow.    DIEUDONNE Bowman

## 2024-11-07 ENCOUNTER — OFFICE VISIT (OUTPATIENT)
Dept: CARDIOLOGY | Facility: CLINIC | Age: 64
End: 2024-11-07
Payer: MEDICARE

## 2024-11-07 VITALS
HEART RATE: 71 BPM | OXYGEN SATURATION: 98 % | WEIGHT: 227 LBS | BODY MASS INDEX: 33.62 KG/M2 | DIASTOLIC BLOOD PRESSURE: 58 MMHG | HEIGHT: 69 IN | SYSTOLIC BLOOD PRESSURE: 102 MMHG

## 2024-11-07 DIAGNOSIS — E78.5 DYSLIPIDEMIA: ICD-10-CM

## 2024-11-07 DIAGNOSIS — I42.8 NONISCHEMIC CARDIOMYOPATHY: Primary | Chronic | ICD-10-CM

## 2024-11-07 DIAGNOSIS — I47.29 NSVT (NONSUSTAINED VENTRICULAR TACHYCARDIA): ICD-10-CM

## 2024-11-07 DIAGNOSIS — I10 ESSENTIAL HYPERTENSION: ICD-10-CM

## 2024-11-07 NOTE — PROGRESS NOTES
[facility]    Patient ID: Ren Mario is a 64 y.o. male.  : 1960   Contact: [phone]    Encounter date: 2024    PCP: Heidi Ellington APRN      Chief complaint:   Chief Complaint   Patient presents with    NSVT (nonsustained ventricular tachycardia)    Dizziness       Problem List:  Nonischemic cardiomyopathy:  Abnormal echocardiogram, 2014: EF 25-30%. Mild-to-moderate MR, trace TR, and mild diastolic dysfunction.  Abnormal Cardiolite GXT, 2014, [facility]: EF 25%. Evidence of potential ischemia in the anterolateral and inferior myocardial segments.  LHC, 2014, [facility]: Non-critical CAD. EF 25-30%.  Medical management with initiation of ACE inhibitor therapy (patient was already on beta blockade).  Echocardiogram, 2014: EF 20%. Ttrace MR and trace TR.   Dual-chamber ICD placed 2015, by Dr. Leroy Priest using a [employer] Ellipse DR.   Echocardiogram, 2016: EF 45-50%. Trace MR and trace TR.   Echocardiogram, 2019: EF 55%. Borderline LV enlargement.  Echocardiogram 2023: 50%  ICD generator change, 10/30/2024: Successful Dual Chamber Intracardiac Defibrillator generator removal. Successful insertion of a new [employer] Dual Chamber Intracardiac Defibrillator generator.  Ventricular tachycardia/NSVT  One episode of NSVT lasting 27 beats on 2022  One episode of VT lasting 33 beats on 10/12/2022.  Patient asymptomatic and did not receive shock from device.   Hypertension.   Hyperlipidemia.  Obesity with BMI 39.  Type 2 DM.  Hypothyroidism dx 2018  Chronic low back pain.  Former tobacco abuse, cessation 15+ years ago.  Surgical history:  Left arm fracture    Allergies   Allergen Reactions    Lipitor [Atorvastatin] Myalgia     muscle ache and fatigue.    Naprosyn [Naproxen] Unknown - Low Severity       Current Medications:    Current Outpatient Medications:     allopurinol (ZYLOPRIM) 300 MG tablet, Take 1 tablet by  mouth Daily., Disp: , Rfl:     ASPIRIN LOW DOSE 81 MG EC tablet, Take 1 tablet by mouth Daily., Disp: , Rfl: 1    cetirizine (zyrTEC) 10 MG tablet, Take 1 tablet by mouth Daily., Disp: , Rfl:     colchicine 0.6 MG tablet, Take 1 tablet by mouth As Needed., Disp: , Rfl: 2    cyclobenzaprine (FLEXERIL) 10 MG tablet, Take 1 tablet by mouth As Needed., Disp: , Rfl:     Diclofenac Sodium (VOLTAREN) 1 % gel gel, Apply 4 g topically to the appropriate area as directed 4 (Four) Times a Day., Disp: , Rfl:     FREESTYLE LITE test strip, , Disp: , Rfl:     furosemide (LASIX) 40 MG tablet, Take 1 tablet by mouth Daily., Disp: , Rfl:     glipizide (GLUCOTROL) 5 MG tablet, Take 1 tablet by mouth Daily., Disp: , Rfl:     JANUVIA 100 MG tablet, Take 1 tablet by mouth Daily., Disp: , Rfl:     latanoprost (XALATAN) 0.005 % ophthalmic solution, Administer 1 drop to both eyes Daily., Disp: , Rfl:     levothyroxine (SYNTHROID, LEVOTHROID) 50 MCG tablet, Take 1 tablet by mouth Daily., Disp: , Rfl:     lisinopril (PRINIVIL,ZESTRIL) 5 MG tablet, TAKE 1 TABLET BY MOUTH EVERY DAY, Disp: 90 tablet, Rfl: 3    Magnesium Oxide -Mg Supplement 400 (240 Mg) MG tablet, Take 1 tablet by mouth Daily., Disp: , Rfl:     metFORMIN (GLUCOPHAGE) 1000 MG tablet, Take 1 tablet by mouth 2 (Two) Times a Day., Disp: , Rfl: 2    metoprolol tartrate (LOPRESSOR) 50 MG tablet, Take 1 tablet by mouth 2 (Two) Times a Day., Disp: , Rfl:     montelukast (SINGULAIR) 10 MG tablet, Take 1 tablet by mouth Every Night., Disp: , Rfl:     potassium chloride 10 MEQ CR tablet, Take 1 tablet by mouth Daily., Disp: , Rfl:     rosuvastatin (CRESTOR) 10 MG tablet, TAKE 1 TABLET BY MOUTH DAILY, Disp: 90 tablet, Rfl: 3    traMADol (ULTRAM) 50 MG tablet, Take 1 tablet by mouth As Needed., Disp: , Rfl:     vitamin D (ERGOCALCIFEROL) 1.25 MG (34175 UT) capsule capsule, Take 1 capsule by mouth 1 (One) Time Per Week., Disp: , Rfl:     HPI    Ren Mario is a 64 y.o. male who presents  "today for a hospital follow up of ICD generator change.  No complaints except that the adhesive itches.      The following portions of the patient's history were reviewed and updated as appropriate: allergies, current medications and problem list.    Pertinent positives as listed in the HPI.  All other systems reviewed are negative.         Vitals:    11/07/24 1131   BP: 102/58   BP Location: Left arm   Patient Position: Sitting   Pulse: 71   SpO2: 98%   Weight: 103 kg (227 lb)   Height: 175.3 cm (69\")       Physical Exam:  General: Alert and oriented.  ICD bandage removed.  Minimal old bloody drainage.  Edges well opposed. No hematoma    Diagnostic Data (reviewed with patient):  Lab date: 03/05/2024  FLP: , .6, HDL 24.2, LDL 10.8    Lab Results   Component Value Date    GLUCOSE 128 (H) 10/30/2024    BUN 14 10/30/2024    CREATININE 1.63 (H) 10/30/2024    BCR 8.6 10/30/2024     10/30/2024    K 5.1 10/30/2024    CL 99 10/30/2024    CO2 24.0 10/30/2024    CALCIUM 9.3 10/30/2024     Lab Results   Component Value Date    WBC 12.23 (H) 10/30/2024    RBC 4.83 10/30/2024    HGB 13.9 10/30/2024    HCT 43.4 10/30/2024    MCV 89.9 10/30/2024     10/30/2024      666         Procedures      Assessment:    ICD-10-CM ICD-9-CM   1. Nonischemic cardiomyopathy  I42.8 425.4   2. NSVT (nonsustained ventricular tachycardia)  I47.29 427.1   3. Essential hypertension  I10 401.9   4. Dyslipidemia  E78.5 272.4         Plan:  Continue on aspirin 81 mg for antiplatelet therapy.   Continue on Lasix 40 mg daily for fluid retention.   Continue on lisinopril 5 mg daily for hypertension.   Continue on metoprolol 50 mg BID for rate control and hypertension.   Continue on rosuvastatin 10 mg daily for hyperlipidemia.   Continue all other current medications.  F/up in 6 months, sooner if needed.      Candida Campbell MD, FACC              "

## 2024-12-03 ENCOUNTER — TELEPHONE (OUTPATIENT)
Dept: CARDIOLOGY | Facility: CLINIC | Age: 64
End: 2024-12-03
Payer: MEDICARE

## 2024-12-03 NOTE — TELEPHONE ENCOUNTER
Called patient to see if he was interested in using the home monitoring edelmira for his defibrillator because it hadn't been set up yet.     Patient told me he doesn't get good cell service where he lives.

## 2025-01-27 RX ORDER — ROSUVASTATIN CALCIUM 10 MG/1
10 TABLET, COATED ORAL DAILY
Qty: 90 TABLET | Refills: 3 | Status: SHIPPED | OUTPATIENT
Start: 2025-01-27

## 2025-01-27 NOTE — TELEPHONE ENCOUNTER
3/5/24  Total Chol 117  HDL  24.2  LDL  10.8  Trigs  408.6  Albumin 4.5  Alk Phos 100.4  ALT  34.9  AST  48.7  BUN  18.5  Creat  1.2  GFR  64.8  Calcium 8.7  Cl  100  CO2  24.4  Globulin 3.5  Total Bili 0.2  Total Prot 8.0  Na  136.2  K+  4.2  Glu  141.8

## 2025-05-15 ENCOUNTER — OFFICE VISIT (OUTPATIENT)
Dept: CARDIOLOGY | Facility: CLINIC | Age: 65
End: 2025-05-15
Payer: MEDICARE

## 2025-05-15 VITALS
DIASTOLIC BLOOD PRESSURE: 60 MMHG | SYSTOLIC BLOOD PRESSURE: 106 MMHG | BODY MASS INDEX: 34.54 KG/M2 | OXYGEN SATURATION: 97 % | WEIGHT: 233.2 LBS | HEART RATE: 84 BPM | HEIGHT: 69 IN

## 2025-05-15 DIAGNOSIS — I47.29 NSVT (NONSUSTAINED VENTRICULAR TACHYCARDIA): ICD-10-CM

## 2025-05-15 DIAGNOSIS — I10 ESSENTIAL HYPERTENSION: ICD-10-CM

## 2025-05-15 DIAGNOSIS — I42.8 NONISCHEMIC CARDIOMYOPATHY: Primary | Chronic | ICD-10-CM

## 2025-05-15 RX ORDER — AMLODIPINE BESYLATE 5 MG/1
5 TABLET ORAL DAILY
COMMUNITY
Start: 2025-05-01

## 2025-05-15 NOTE — PROGRESS NOTES
[facility] Cardiology    Date: 05/15/2025    Patient ID: Ren Mario is a 64 y.o. male   : 1960   Contact: [phone]         Chief Complaint:    Chief Complaint   Patient presents with    Nonischemic cardiomyopathy    Dizziness    NSVT (nonsustained ventricular tachycardia)       Problem List:  Nonischemic cardiomyopathy:  Abnormal echocardiogram, 2014: EF 25-30%. Mild-to-moderate MR, trace TR, and mild diastolic dysfunction.  Abnormal Cardiolite GXT, 2014, [facility]: EF 25%. Evidence of potential ischemia in the anterolateral and inferior myocardial segments.  LHC, 2014, [facility]: Non-critical CAD. EF 25-30%.  Medical management with initiation of ACE inhibitor therapy (patient was already on beta blockade).  Echocardiogram, 2014: EF 20%. Ttrace MR and trace TR.   Dual-chamber ICD placed 2015, by Dr. Leroy Priest using a [employer] Ellipse DR.   Echocardiogram, 2016: EF 45-50%. Trace MR and trace TR.   Echocardiogram, 2019: EF 55%. Borderline LV enlargement.  Echocardiogram 2023: 50%  ICD generator change, 10/30/2024: Successful Dual Chamber Intracardiac Defibrillator generator removal. Successful insertion of a new [employer] Dual Chamber Intracardiac Defibrillator generator.  Ventricular tachycardia/NSVT  One episode of NSVT lasting 27 beats on 2022  One episode of VT lasting 33 beats on 10/12/2022.  Patient asymptomatic and did not receive shock from device.   Hypertension.   Hyperlipidemia.  Obesity with BMI 39.  Type 2 DM.  Hypothyroidism dx 2018  Chronic low back pain.  Former tobacco abuse, cessation 15+ years ago.  Surgical history:  Left arm fracture      Allergies   Allergen Reactions    Lipitor [Atorvastatin] Myalgia     muscle ache and fatigue.    Naprosyn [Naproxen] Unknown - Low Severity         Current Outpatient Medications:     allopurinol (ZYLOPRIM) 300 MG tablet, Take 1 tablet by mouth Daily., Disp: , Rfl:      amLODIPine (NORVASC) 5 MG tablet, Take 1 tablet by mouth Daily., Disp: , Rfl:     ASPIRIN LOW DOSE 81 MG EC tablet, Take 1 tablet by mouth Daily., Disp: , Rfl: 1    cetirizine (zyrTEC) 10 MG tablet, Take 1 tablet by mouth Daily., Disp: , Rfl:     colchicine 0.6 MG tablet, Take 1 tablet by mouth As Needed., Disp: , Rfl: 2    cyclobenzaprine (FLEXERIL) 10 MG tablet, Take 1 tablet by mouth As Needed., Disp: , Rfl:     Diclofenac Sodium (VOLTAREN) 1 % gel gel, Apply 4 g topically to the appropriate area as directed 4 (Four) Times a Day., Disp: , Rfl:     FREESTYLE LITE test strip, , Disp: , Rfl:     furosemide (LASIX) 40 MG tablet, Take 1 tablet by mouth Daily., Disp: , Rfl:     glipizide (GLUCOTROL) 5 MG tablet, Take 1 tablet by mouth Daily., Disp: , Rfl:     JANUVIA 100 MG tablet, Take 1 tablet by mouth Daily., Disp: , Rfl:     latanoprost (XALATAN) 0.005 % ophthalmic solution, Administer 1 drop to both eyes Daily., Disp: , Rfl:     levothyroxine (SYNTHROID, LEVOTHROID) 50 MCG tablet, Take 1 tablet by mouth Daily., Disp: , Rfl:     lisinopril (PRINIVIL,ZESTRIL) 5 MG tablet, TAKE 1 TABLET BY MOUTH EVERY DAY, Disp: 90 tablet, Rfl: 3    Magnesium Oxide -Mg Supplement 400 (240 Mg) MG tablet, Take 1 tablet by mouth Daily., Disp: , Rfl:     metFORMIN (GLUCOPHAGE) 1000 MG tablet, Take 1 tablet by mouth 2 (Two) Times a Day., Disp: , Rfl: 2    metoprolol tartrate (LOPRESSOR) 50 MG tablet, Take 1 tablet by mouth 2 (Two) Times a Day., Disp: , Rfl:     montelukast (SINGULAIR) 10 MG tablet, Take 1 tablet by mouth Every Night., Disp: , Rfl:     potassium chloride 10 MEQ CR tablet, Take 1 tablet by mouth Daily., Disp: , Rfl:     rosuvastatin (CRESTOR) 10 MG tablet, TAKE 1 TABLET BY MOUTH DAILY, Disp: 90 tablet, Rfl: 3    traMADol (ULTRAM) 50 MG tablet, Take 1 tablet by mouth As Needed., Disp: , Rfl:     vitamin D (ERGOCALCIFEROL) 1.25 MG (46344 UT) capsule capsule, Take 1 capsule by mouth 1 (One) Time Per Week., Disp: , Rfl:   "    History of Present Illness: Ren Mario is a 64 y.o. male who is here today for 6-month follow-up for nonischemic cardiomyopathy, history of NSVT, hypertension and hyperlipidemia.Patient overall has been doing well from a cardiac perspective.  He has not been very active as his back pain limits him.  Blood pressure on the lower end of normal but at home he does not check it regularly.  patient has had no passing out episodes.  Patient has had recent blood work with his PCP which we do not have available.  He also  still does not have reception in his house to have remote monitoring for his device.      The following portions of the patient's history were reviewed and updated as appropriate: allergies, current medications and problem list.     Pertinent positives as listed in the HPI.  All other systems reviewed are negative.            Vitals:    05/15/25 1310   BP: 106/60   BP Location: Right arm   Patient Position: Sitting   Pulse: 84   SpO2: 97%   Weight: 106 kg (233 lb 3.2 oz)   Height: 175.3 cm (69\")     Body mass index is 34.44 kg/m².    Physical Exam:  General: Alert and oriented.  Neck: Jugular venous pressure is within normal limits. Carotids have normal upstrokes without bruits.   Cardiovascular: Regular rate and rhythm. No murmur, gallop or rub.  Lungs: Clear, no rales or wheezes. Equal expansion is noted.   Extremities: No peripheral edema  Skin: Warm and dry.  Neurologic: Nonfocal.     Diagnostic data (reviewed with patient):  CMP:   Lab Results   Component Value Date    GLUCOSE 128 (H) 10/30/2024    BUN 14 10/30/2024    CREATININE 1.63 (H) 10/30/2024    BCR 8.6 10/30/2024    K 5.1 10/30/2024    CO2 24.0 10/30/2024    CALCIUM 9.3 10/30/2024       CBC:    Lab Results   Component Value Date    WBC 12.23 (H) 10/30/2024    HGB 13.9 10/30/2024    HCT 43.4 10/30/2024    MCV 89.9 10/30/2024     10/30/2024       Procedures   Device interrogation [employer] ICD DDD IR mode at 60.  RA pacing 80%.  " P wave greater than 5.  Threshold 0.75 V at 0.5 ms.  Impedance 480 ohms.  RV pacing less than 1% R wave greater than 12 threshold 0.75 V at 0.5 ms.  Impedance 580 ohms.  Battery life 7.7 years.  Underlying rhythm sinus bradycardia and no events..    Advance Care Planning   ACP discussion was declined by the patient. Patient does not have an advance directive, declines further assistance.            Assessment:  1. Nonischemic cardiomyopathy    2. NSVT (nonsustained ventricular tachycardia)    3. Essential hypertension             Plan:  Stable cardiac status with no signs of heart failure.  Continue amlodipine 5 mg daily, lisinopril 5 mg daily for hypertension.  Continue metoprolol tartrate 50 mg twice daily for rate control.  Continue furosemide 40 mg daily and potassium 10 mill equivalents daily for diuretic therapy.  Will try to obtain most recent blood work to make sure patient's kidney function is still stable on these medications.  Continue all other current medications.  F/up in 6 months, sooner if needed.  With a device interrogation      Natalie Archuleta PA-C

## 2025-08-19 LAB
MC_CV_MDC_IDC_RATE_1: 171
MC_CV_MDC_IDC_RATE_1: 222
MC_CV_MDC_IDC_SHOCK_MEASURED_IMPEDANCE: 86
MC_CV_MDC_IDC_THERAPIES: NORMAL
MC_CV_MDC_IDC_ZONE_ID: 1
MC_CV_MDC_IDC_ZONE_ID: 2
MC_CV_MDC_IDC_ZONE_ID: 3
MDC_IDC_MSMT_BATTERY_REMAINING_LONGEVITY: 88 MO
MDC_IDC_MSMT_BATTERY_REMAINING_PERCENTAGE: 87 %
MDC_IDC_MSMT_BATTERY_RRT_TRIGGER: 2.62
MDC_IDC_MSMT_BATTERY_STATUS: NORMAL
MDC_IDC_MSMT_BATTERY_VOLTAGE: 2.99
MDC_IDC_MSMT_CAP_CHARGE_TIME: 8.9
MDC_IDC_MSMT_LEADCHNL_RA_IMPEDANCE_VALUE: 400
MDC_IDC_MSMT_LEADCHNL_RA_PACING_THRESHOLD_POLARITY: NORMAL
MDC_IDC_MSMT_LEADCHNL_RA_SENSING_INTR_AMPL: 5
MDC_IDC_MSMT_LEADCHNL_RV_IMPEDANCE_VALUE: 550
MDC_IDC_MSMT_LEADCHNL_RV_PACING_THRESHOLD_POLARITY: NORMAL
MDC_IDC_MSMT_LEADCHNL_RV_SENSING_INTR_AMPL: 12
MDC_IDC_PG_IMPLANT_DTM: NORMAL
MDC_IDC_PG_MFG: NORMAL
MDC_IDC_PG_MODEL: NORMAL
MDC_IDC_PG_SERIAL: NORMAL
MDC_IDC_PG_TYPE: NORMAL
MDC_IDC_SESS_DTM: NORMAL
MDC_IDC_SESS_TYPE: NORMAL
MDC_IDC_SET_BRADY_AT_MODE_SWITCH_RATE: 180
MDC_IDC_SET_BRADY_LOWRATE: 60
MDC_IDC_SET_BRADY_MAX_SENSOR_RATE: 110
MDC_IDC_SET_BRADY_MODE: NORMAL
MDC_IDC_SET_BRADY_PAV_DELAY: 350
MDC_IDC_SET_BRADY_SAV_DELAY: 70
MDC_IDC_SET_LEADCHNL_RA_PACING_AMPLITUDE: 1.75
MDC_IDC_SET_LEADCHNL_RA_PACING_POLARITY: NORMAL
MDC_IDC_SET_LEADCHNL_RA_PACING_PULSEWIDTH: 0.5
MDC_IDC_SET_LEADCHNL_RA_SENSING_POLARITY: NORMAL
MDC_IDC_SET_LEADCHNL_RA_SENSING_SENSITIVITY: 0.3
MDC_IDC_SET_LEADCHNL_RV_PACING_AMPLITUDE: 2
MDC_IDC_SET_LEADCHNL_RV_PACING_POLARITY: NORMAL
MDC_IDC_SET_LEADCHNL_RV_PACING_PULSEWIDTH: 0.5
MDC_IDC_SET_LEADCHNL_RV_SENSING_POLARITY: NORMAL
MDC_IDC_SET_LEADCHNL_RV_SENSING_SENSITIVITY: 0.5
MDC_IDC_SET_ZONE_STATUS: NORMAL
MDC_IDC_SET_ZONE_TYPE: NORMAL
MDC_IDC_STAT_AT_BURDEN_PERCENT: 0
MDC_IDC_STAT_BRADY_RA_PERCENT_PACED: 72
MDC_IDC_STAT_BRADY_RV_PERCENT_PACED: 1
MDC_IDC_STAT_TACHYTHERAPY_ATP_DELIVERED_RECENT: 0
MDC_IDC_STAT_TACHYTHERAPY_SHOCKS_ABORTED_RECENT: 0
MDC_IDC_STAT_TACHYTHERAPY_SHOCKS_DELIVERED_RECENT: 0

## 2025-08-25 RX ORDER — LISINOPRIL 5 MG/1
5 TABLET ORAL DAILY
Qty: 90 TABLET | Refills: 3 | Status: SHIPPED | OUTPATIENT
Start: 2025-08-25

## (undated) DEVICE — CAUTERY TIP POLISHER: Brand: DEVON

## (undated) DEVICE — PLASMABLADE PS210-030S 3.0S LOCK: Brand: PLASMABLADE™

## (undated) DEVICE — MEDI-VAC YANKAUER SUCTION HANDLE W/BULBOUS TIP: Brand: CARDINAL HEALTH

## (undated) DEVICE — ELECTRD RETRN/GRND MEGADYNE SGL/PLT W/CORD 9FT DISP

## (undated) DEVICE — TUBING, SUCTION, 1/4" X 10', STRAIGHT: Brand: MEDLINE

## (undated) DEVICE — ADULT, W/LG. BACK PAD, RADIOTRANSPARENT ELEMENT AND LEAD WIRE COMPATIBLE W/: Brand: DEFIBRILLATION ELECTRODES

## (undated) DEVICE — SET PRIMARY GRVTY 10DP MALE LL 104IN

## (undated) DEVICE — DRSNG SURG AQUACEL AG/ADVNTGE 9X15CM 3.5X6IN

## (undated) DEVICE — SOL NACL 0.9PCT 1000ML

## (undated) DEVICE — LEX ELECTRO PHYSIOLOGY: Brand: MEDLINE INDUSTRIES, INC.

## (undated) DEVICE — ADULT NASAL CO2 SAMPLING WITH O2 DELIVERY CANNULA FOR CAPNOFLEX MODULE: Brand: VITAL SIGNS™

## (undated) DEVICE — IRRIGATOR BULB ASEPTO 60CC STRL

## (undated) DEVICE — DECANT BG O JET

## (undated) DEVICE — ST EXT IV SMRTSTE 2VLV FIX M LL 6ML 41

## (undated) DEVICE — LIMB HOLDER, WRIST/ANKLE: Brand: DEROYAL

## (undated) DEVICE — ST INF PRI SMRTSTE 20DRP 2VLV 24ML 117